# Patient Record
Sex: FEMALE | Race: WHITE | NOT HISPANIC OR LATINO
[De-identification: names, ages, dates, MRNs, and addresses within clinical notes are randomized per-mention and may not be internally consistent; named-entity substitution may affect disease eponyms.]

---

## 2018-08-07 PROBLEM — Z00.00 ENCOUNTER FOR PREVENTIVE HEALTH EXAMINATION: Status: ACTIVE | Noted: 2018-08-07

## 2018-08-13 ENCOUNTER — NON-APPOINTMENT (OUTPATIENT)
Age: 53
End: 2018-08-13

## 2018-08-13 ENCOUNTER — APPOINTMENT (OUTPATIENT)
Dept: CARDIOLOGY | Facility: CLINIC | Age: 53
End: 2018-08-13
Payer: COMMERCIAL

## 2018-08-13 VITALS
WEIGHT: 190 LBS | SYSTOLIC BLOOD PRESSURE: 100 MMHG | DIASTOLIC BLOOD PRESSURE: 70 MMHG | HEIGHT: 64 IN | BODY MASS INDEX: 32.44 KG/M2 | HEART RATE: 70 BPM

## 2018-08-13 DIAGNOSIS — Z82.0 FAMILY HISTORY OF EPILEPSY AND OTHER DISEASES OF THE NERVOUS SYSTEM: ICD-10-CM

## 2018-08-13 DIAGNOSIS — Z87.19 PERSONAL HISTORY OF OTHER DISEASES OF THE DIGESTIVE SYSTEM: ICD-10-CM

## 2018-08-13 DIAGNOSIS — Q21.1 ATRIAL SEPTAL DEFECT: ICD-10-CM

## 2018-08-13 DIAGNOSIS — Z83.3 FAMILY HISTORY OF DIABETES MELLITUS: ICD-10-CM

## 2018-08-13 PROCEDURE — 99244 OFF/OP CNSLTJ NEW/EST MOD 40: CPT

## 2018-08-13 PROCEDURE — 93000 ELECTROCARDIOGRAM COMPLETE: CPT

## 2018-08-13 RX ORDER — HYDROCHLOROTHIAZIDE 25 MG/1
25 TABLET ORAL DAILY
Qty: 90 | Refills: 1 | Status: ACTIVE | COMMUNITY

## 2018-08-27 ENCOUNTER — APPOINTMENT (OUTPATIENT)
Dept: CARDIOLOGY | Facility: CLINIC | Age: 53
End: 2018-08-27
Payer: COMMERCIAL

## 2018-08-27 PROBLEM — Q21.1 PFO (PATENT FORAMEN OVALE): Status: RESOLVED | Noted: 2018-08-13 | Resolved: 2018-08-27

## 2018-08-27 PROCEDURE — 93306 TTE W/DOPPLER COMPLETE: CPT

## 2018-08-29 ENCOUNTER — APPOINTMENT (OUTPATIENT)
Dept: CARDIOLOGY | Facility: CLINIC | Age: 53
End: 2018-08-29
Payer: COMMERCIAL

## 2018-08-29 PROCEDURE — 93351 STRESS TTE COMPLETE: CPT

## 2018-09-14 ENCOUNTER — APPOINTMENT (OUTPATIENT)
Dept: CARDIOLOGY | Facility: CLINIC | Age: 53
End: 2018-09-14
Payer: COMMERCIAL

## 2018-09-14 VITALS
WEIGHT: 190 LBS | DIASTOLIC BLOOD PRESSURE: 70 MMHG | BODY MASS INDEX: 32.44 KG/M2 | SYSTOLIC BLOOD PRESSURE: 110 MMHG | HEART RATE: 66 BPM | HEIGHT: 64 IN

## 2018-09-14 PROCEDURE — 99214 OFFICE O/P EST MOD 30 MIN: CPT

## 2019-10-08 ENCOUNTER — APPOINTMENT (OUTPATIENT)
Dept: CARDIOLOGY | Facility: CLINIC | Age: 54
End: 2019-10-08
Payer: COMMERCIAL

## 2019-10-08 ENCOUNTER — NON-APPOINTMENT (OUTPATIENT)
Age: 54
End: 2019-10-08

## 2019-10-08 VITALS
WEIGHT: 190 LBS | SYSTOLIC BLOOD PRESSURE: 118 MMHG | HEIGHT: 64 IN | BODY MASS INDEX: 32.44 KG/M2 | HEART RATE: 61 BPM | OXYGEN SATURATION: 98 % | DIASTOLIC BLOOD PRESSURE: 70 MMHG

## 2019-10-08 PROCEDURE — 93000 ELECTROCARDIOGRAM COMPLETE: CPT

## 2019-10-08 PROCEDURE — 99214 OFFICE O/P EST MOD 30 MIN: CPT

## 2019-10-08 RX ORDER — FEXOFENADINE HYDROCHLORIDE 180 MG/1
180 TABLET, FILM COATED ORAL
Refills: 0 | Status: DISCONTINUED | COMMUNITY
End: 2019-10-08

## 2019-10-08 NOTE — HISTORY OF PRESENT ILLNESS
[FreeTextEntry1] : Mrs. Beck is a 54-year-old female that came in today 10-8-19 for yearly follow up. \par \par She was last seen 9-14-18. \par \par As you know she has a history of abnormal ekg, hypertension, dyslipidemia, overweight, PFO, asthma and GERD. 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 She states that she is very active around the office but does not have an exercise regimen. \par She denies chest pain, sob, palpitations, dizziness, syncope, orthopnea and PND.\par \par Labs/tests\par \par labs 8-4-19 Na+ 141, K 4.4, bun/creat 21/0.85, AST 22, ALT 20, mag 2.0, , HDL 49, triglycerides 131, TSH 2.40, wbc 4.8, h/h 13.0/40.8, plat 204, abn u/a\par \par echo 8-27-18 ef 60% with normal ventricular function, mild MR, minimal AR, mild TR, mild AK, normal pulmonary pressures (23mmhg), atrial septal closure device seen and appears well seated with no residual interatrial flow by doppler\par \par S-echo 8-29-18 10:09 exercise reaching 85% MPR, peak bp 204/104, asx, normal EKG, grossly normal augmentation in levt ventricular function. PASP WNL\par \par labs 7-26-18 Na+ 140, K 4.6, bun/creat 21/0.76, AST 26, aLT 24, , HDL 54, triglycerides 116, TSH 2.60, wbc 5.1, h/h 12.8/38.9, plat 237

## 2019-10-08 NOTE — PHYSICAL EXAM
[Normal Appearance] : normal appearance [No Deformities] : no deformities [] : no respiratory distress [Respiration, Rhythm And Depth] : normal respiratory rhythm and effort [Exaggerated Use Of Accessory Muscles For Inspiration] : no accessory muscle use [Heart Rate And Rhythm] : heart rate and rhythm were normal [Bowel Sounds] : normal bowel sounds [Murmurs] : no murmurs present [Heart Sounds] : normal S1 and S2 [Abnormal Walk] : normal gait [Abdomen Soft] : soft [Affect] : the affect was normal [Skin Color & Pigmentation] : normal skin color and pigmentation [Mood] : the mood was normal [FreeTextEntry1] : no edema 2+ b/l distal pulses noted

## 2019-10-08 NOTE — DISCUSSION/SUMMARY
[FreeTextEntry1] : Plan 10-8-19\par \par -CAD risk factors of hypertension, dyslipidemia, overweight, age, abnormal EKG. Currently stable and asymptomatic. S-echo 8-29-18 as above without evidence of ischemia and normal pulmonary pressures. Echo as above 8-27-18 with preserved EF and normal ventricular function. The patient is aware of the diagnostic accuracy of testing. Therefore, she is aware if any changes in symptoms, she should be re-evaluated. She should continue with primary prevention, increase exercise. \par \par -h/o PFO closure [place] 12 years ago. Echo as above with stable closure. Surveillance monitoring (as long as stable symptoms per PD, Q 3-5years). Per Dr. Fisher, patient should be on life-long Asa 81mg daily (as long as there are no contraindications). Patient aware to make sure Asa is coated and to take with food. \par \par -lipids as above. On Statin. .\par \par -Hypertension. Controlled. Continue with lifestyle modification. \par \par -patient states she has followed with her PCP for recent labs as above, including abnormal u/a (per pt being rechecked). \par \par f/u yearly, sooner if changes in symptoms or status. \par \par Sincerely,\par \par Karen Riley PA-C\par patient seen and plan advised by supervising physician Dr. Viveros\par

## 2020-10-29 ENCOUNTER — NON-APPOINTMENT (OUTPATIENT)
Age: 55
End: 2020-10-29

## 2020-10-29 ENCOUNTER — APPOINTMENT (OUTPATIENT)
Dept: CARDIOLOGY | Facility: CLINIC | Age: 55
End: 2020-10-29
Payer: COMMERCIAL

## 2020-10-29 VITALS
SYSTOLIC BLOOD PRESSURE: 108 MMHG | OXYGEN SATURATION: 99 % | DIASTOLIC BLOOD PRESSURE: 80 MMHG | TEMPERATURE: 98 F | HEART RATE: 62 BPM | WEIGHT: 194 LBS | HEIGHT: 64 IN | BODY MASS INDEX: 33.12 KG/M2

## 2020-10-29 PROCEDURE — 93000 ELECTROCARDIOGRAM COMPLETE: CPT

## 2020-10-29 PROCEDURE — 99072 ADDL SUPL MATRL&STAF TM PHE: CPT

## 2020-10-29 PROCEDURE — 99214 OFFICE O/P EST MOD 30 MIN: CPT

## 2020-10-29 RX ORDER — ASPIRIN 81 MG
81 TABLET,CHEWABLE ORAL DAILY
Refills: 0 | Status: ACTIVE | COMMUNITY

## 2020-10-29 NOTE — DISCUSSION/SUMMARY
[FreeTextEntry1] : Stress echocardiogram August 2018 was negative for ischemia with good levels of exercise.  PASP was normal.\par \par history of asthma and history of PFO repair.\par \par Hypercholestremia: Continue statins. Dietary modification to reduce LDL was discussed. Have requested a copy of recent fasting lipid profile.\par \par Hypertension.  Controlled.  She already takes significant amount of water and potassium supplements. She does have occasional muscle cramps.\par \par Follow-up in 1 year.  \par \par Sections of this transcription were entered using Voice Recognition Software. There may be an error with phonetically similar sounding words. Please call me if there are any questions re the content.\par \par \par Sincerely,\par Dandy Fisher MD, FACC, TINO   \par \par

## 2020-10-29 NOTE — ASSESSMENT
[FreeTextEntry1] : EKG 8/3/18: Sinus rhythm. LVH. The inverted in inferior leads, V3 to V6. Previous EKG for comparison is not available.\par \par

## 2020-10-29 NOTE — PHYSICAL EXAM
[General Appearance - Well Developed] : well developed [Normal Appearance] : normal appearance [Well Groomed] : well groomed [General Appearance - Well Nourished] : well nourished [No Deformities] : no deformities [General Appearance - In No Acute Distress] : no acute distress [Normal Conjunctiva] : the conjunctiva exhibited no abnormalities [Eyelids - No Xanthelasma] : the eyelids demonstrated no xanthelasmas [Respiration, Rhythm And Depth] : normal respiratory rhythm and effort [Exaggerated Use Of Accessory Muscles For Inspiration] : no accessory muscle use [Auscultation Breath Sounds / Voice Sounds] : lungs were clear to auscultation bilaterally [Heart Rate And Rhythm] : heart rate and rhythm were normal [Heart Sounds] : normal S1 and S2 [Murmurs] : no murmurs present [Abdomen Soft] : soft [Abdomen Tenderness] : non-tender [FreeTextEntry1] : obese [Abnormal Walk] : normal gait [Gait - Sufficient For Exercise Testing] : the gait was sufficient for exercise testing [Nail Clubbing] : no clubbing of the fingernails [Cyanosis, Localized] : no localized cyanosis [Skin Color & Pigmentation] : normal skin color and pigmentation [Skin Turgor] : normal skin turgor [] : no rash [Oriented To Time, Place, And Person] : oriented to person, place, and time [Affect] : the affect was normal [Mood] : the mood was normal [No Anxiety] : not feeling anxious

## 2020-10-29 NOTE — HISTORY OF PRESENT ILLNESS
[FreeTextEntry1] : Jailyn is a 55-year-old female with history of hypertension, dyslipidemia, overweight, PFO, asthma and GERD. She was noted to have abnormal EKG. \par \par She does have mild shortness of breath on exertion.  It has been stable and not associated PND or orthopnea. \par \par In the past, EKG had new T-wave inversions in Dr. Davalos s office 2018  and she was referred for further cardiac evaluation.\par \par History of PFO closure 2016; currently on aspirin.\par \par Hypertension is well controlled.

## 2021-11-16 ENCOUNTER — APPOINTMENT (OUTPATIENT)
Dept: CARDIOLOGY | Facility: CLINIC | Age: 56
End: 2021-11-16
Payer: COMMERCIAL

## 2021-11-16 ENCOUNTER — NON-APPOINTMENT (OUTPATIENT)
Age: 56
End: 2021-11-16

## 2021-11-16 VITALS
TEMPERATURE: 97.9 F | HEART RATE: 68 BPM | SYSTOLIC BLOOD PRESSURE: 150 MMHG | DIASTOLIC BLOOD PRESSURE: 90 MMHG | BODY MASS INDEX: 33.29 KG/M2 | HEIGHT: 64 IN | WEIGHT: 195 LBS | OXYGEN SATURATION: 98 %

## 2021-11-16 DIAGNOSIS — J45.909 UNSPECIFIED ASTHMA, UNCOMPLICATED: ICD-10-CM

## 2021-11-16 PROCEDURE — 93000 ELECTROCARDIOGRAM COMPLETE: CPT

## 2021-11-16 PROCEDURE — 99214 OFFICE O/P EST MOD 30 MIN: CPT

## 2021-11-16 NOTE — HISTORY OF PRESENT ILLNESS
[FreeTextEntry1] : Jailyn is a 56-year-old female with history of hypertension, dyslipidemia, overweight, PFO, asthma and GERD. She was noted to have abnormal EKG. \par \par She has mild shortness of breath on exertion.  It has been stable and not associated PND or orthopnea. \par \par Previously, she had EKG with T-wave inversions which are benign after evaluation by echocardiogram and stress test\par \par History of PFO closure 2016; currently on aspirin.\par \par Hypertension is not well controlled.  Currently on hydrochlorothiazide and lisinopril. Due to allergies and sinus congestion, she had to take Sudafed and Claritin. She is aware that the Sudafed will raise her blood pressure. She is asymptomatic for hypertension\par \par Labs from September 2021 showed  and normal LFT and creatinine.  FBS 82.  A1c 5.6.

## 2021-11-16 NOTE — PHYSICAL EXAM
[General Appearance - Well Developed] : well developed [Normal Appearance] : normal appearance [Well Groomed] : well groomed [General Appearance - Well Nourished] : well nourished [No Deformities] : no deformities [General Appearance - In No Acute Distress] : no acute distress [Normal Conjunctiva] : the conjunctiva exhibited no abnormalities [Eyelids - No Xanthelasma] : the eyelids demonstrated no xanthelasmas [Respiration, Rhythm And Depth] : normal respiratory rhythm and effort [Exaggerated Use Of Accessory Muscles For Inspiration] : no accessory muscle use [Auscultation Breath Sounds / Voice Sounds] : lungs were clear to auscultation bilaterally [Heart Rate And Rhythm] : heart rate and rhythm were normal [Heart Sounds] : normal S1 and S2 [Murmurs] : no murmurs present [Abdomen Soft] : soft [Abdomen Tenderness] : non-tender [Abnormal Walk] : normal gait [Gait - Sufficient For Exercise Testing] : the gait was sufficient for exercise testing [Nail Clubbing] : no clubbing of the fingernails [Cyanosis, Localized] : no localized cyanosis [Skin Color & Pigmentation] : normal skin color and pigmentation [Skin Turgor] : normal skin turgor [] : no rash [Oriented To Time, Place, And Person] : oriented to person, place, and time [Affect] : the affect was normal [Mood] : the mood was normal [No Anxiety] : not feeling anxious [FreeTextEntry1] : obese

## 2021-11-16 NOTE — ASSESSMENT
[FreeTextEntry1] : Reviewed today:\par -EKG 8/3/18: Sinus rhythm. LVH. The inverted in inferior leads, V3 to V6. Previous EKG for comparison is not available.\par -Labs September 2021: \par \par

## 2021-11-16 NOTE — DISCUSSION/SUMMARY
[FreeTextEntry1] : Stress echocardiogram August 2018 was negative for ischemia with good levels of exercise.  PASP was normal.\par \par history of asthma and history of PFO repair.\par \par Hypercholestremia: Continue statins. Dietary modification and exercise program to reduce LDL was discussed. In September 2021, LDL was 102. Target LDL less than 85. Repeat lipid profile with primary care is scheduled for December.\par \par Hypertension.  uncontrolled. The patient feels that this is secondary to her taking Sudafed. Otherwise her blood pressures are well controlled. I have asked her to take an extra half a tablet of lisinopril when she takes a Sudafed. Follow-up blood pressure with primary care office and she will call us with the readings\par \par EKG in the office today has nonspecific T waves in lead V3 and F. This is not significantly different from previous EKG. \par \par Follow-up in 2-3 m\par \par Sections of this transcription were entered using Voice Recognition Software. There may be an error with phonetically similar sounding words. Please call me if there are any questions re the content.\par \par \par Sincerely,\par Dandy Fisher MD, FACC, TINO   \par \par

## 2022-02-08 ENCOUNTER — APPOINTMENT (OUTPATIENT)
Dept: CARDIOLOGY | Facility: CLINIC | Age: 57
End: 2022-02-08
Payer: COMMERCIAL

## 2022-02-08 VITALS
OXYGEN SATURATION: 100 % | SYSTOLIC BLOOD PRESSURE: 122 MMHG | BODY MASS INDEX: 33.97 KG/M2 | TEMPERATURE: 96.9 F | WEIGHT: 199 LBS | HEART RATE: 66 BPM | DIASTOLIC BLOOD PRESSURE: 72 MMHG | HEIGHT: 64 IN

## 2022-02-08 DIAGNOSIS — K21.9 GASTRO-ESOPHAGEAL REFLUX DISEASE W/OUT ESOPHAGITIS: ICD-10-CM

## 2022-02-08 PROCEDURE — 99214 OFFICE O/P EST MOD 30 MIN: CPT

## 2022-02-08 NOTE — ASSESSMENT
[FreeTextEntry1] : Reviewed today:\par -EKG 8/3/18: Sinus rhythm. LVH. The inverted in inferior leads, V3 to V6. Previous EKG for comparison is not available.\par -Labs September 2021: \par -Labs December 2021: , .\par \par

## 2022-02-08 NOTE — HISTORY OF PRESENT ILLNESS
[FreeTextEntry1] : Jailyn is a 56-year-old female with history of hypertension, dyslipidemia, overweight, PFO, asthma and GERD. She was noted to have abnormal EKG. \par \par She has mild shortness of breath on exertion.  It has been stable and not associated PND or orthopnea. \par \par Previously, she had EKG with T-wave inversions which are benign after evaluation by echocardiogram and stress test\par \par History of PFO closure 2016; currently on aspirin.  History of TIA or CVA or syncope.\par \par Hypertension is  well controlled.  Currently on hydrochlorothiazide and lisinopril. \par \par Labs from September 2021 showed  and normal LFT and creatinine.  FBS 82.  A1c 5.6.  LDL was 116 in December 2021.  .  Normal LFT.  Normal TSH.  A1c 5.4.

## 2022-02-08 NOTE — DISCUSSION/SUMMARY
[FreeTextEntry1] : Stress echocardiogram August 2018 was negative for ischemia with good levels of exercise.  PASP was normal.\par \par history of asthma and history of PFO repair.\par \par Hypercholestremia: Continue statins. Dietary modification and exercise program to reduce LDL was discussed. In September 2021, LDL was 102. Target LDL less than 85.  December, the LDL increased.  The patient admits to dietary indiscretion during the holiday season.  Follow-up fasting lipids in 2 to 3 months.  Lab slip was given to her.\par \par Hypertension. controlled.  Continue current medications\par \par EKG in the office during previous OV has nonspecific T waves in lead V3 and F. This is not significantly different from previous EKG. \par \par ETT and echo should be performed for evaluation of dyspnea on exertion as well as exercise ability and abnormal EKG as well as blood pressure response to exercise. \par \par \par OV after above tests\par \par Sections of this transcription were entered using Voice Recognition Software. There may be an error with phonetically similar sounding words. Please call me if there are any questions re the content.\par \par \par Sincerely,\par Dandy Fisher MD, FACC, TINO   \par \par

## 2022-05-02 ENCOUNTER — APPOINTMENT (OUTPATIENT)
Dept: CARDIOLOGY | Facility: CLINIC | Age: 57
End: 2022-05-02
Payer: COMMERCIAL

## 2022-05-02 PROCEDURE — 93015 CV STRESS TEST SUPVJ I&R: CPT

## 2022-05-02 PROCEDURE — 93306 TTE W/DOPPLER COMPLETE: CPT

## 2022-05-23 ENCOUNTER — APPOINTMENT (OUTPATIENT)
Dept: CARDIOLOGY | Facility: CLINIC | Age: 57
End: 2022-05-23
Payer: COMMERCIAL

## 2022-05-23 PROCEDURE — A9502: CPT

## 2022-05-23 PROCEDURE — 93015 CV STRESS TEST SUPVJ I&R: CPT

## 2022-05-23 PROCEDURE — 78452 HT MUSCLE IMAGE SPECT MULT: CPT

## 2022-05-27 ENCOUNTER — APPOINTMENT (OUTPATIENT)
Dept: CARDIOLOGY | Facility: CLINIC | Age: 57
End: 2022-05-27
Payer: COMMERCIAL

## 2022-05-27 VITALS
HEIGHT: 64 IN | TEMPERATURE: 97.3 F | WEIGHT: 198 LBS | HEART RATE: 64 BPM | DIASTOLIC BLOOD PRESSURE: 70 MMHG | OXYGEN SATURATION: 100 % | SYSTOLIC BLOOD PRESSURE: 100 MMHG | BODY MASS INDEX: 33.8 KG/M2

## 2022-05-27 PROCEDURE — 99214 OFFICE O/P EST MOD 30 MIN: CPT

## 2022-05-27 NOTE — ASSESSMENT
[FreeTextEntry1] : Reviewed today 5/27/2022:\par -Labs: 4/2022 Total chol 181  HDL 46 Creat 0.82 K+ 4.2 Ast 18 Alt 18 \par -Nuclear Stress test: Exercise 6min 7 METS REAGAN that resolved with rest. EKGs non-diagnostic due to baseline abnormality. Nuclear Findings: Small mild defect in anteroseptal wall that is fixed- sugg of scar. Normal wall motion. Accuracy limited by breast attenuation.\par -ETT: 5/2/2022 Suggestive is ischemia by EKG due to depressions inferolaterally in recovery\par -ECHO 5/2/2022 EF 60% Mild MR, Normal LV systolic function and no seg. wall motion abnormalities. Ectopy noted Mild ID. Normal PASP. \par \par Previous testing:\par -EKG 8/3/18: Sinus rhythm. LVH. The inverted in inferior leads, V3 to V6. Previous EKG for comparison is not available.\par -Stress echocardiogram August 2018 was negative for ischemia with good levels of exercise.  PASP was normal.\par -Labs from September 2021 showed  and normal LFT and creatinine.  FBS 82.  A1c 5.6.  LDL was 116 in December 2021.  .  Normal LFT.  Normal TSH.  A1c 5.4.\par \par \par

## 2022-05-27 NOTE — PHYSICAL EXAM
[General Appearance - Well Developed] : well developed [Normal Appearance] : normal appearance [Well Groomed] : well groomed [General Appearance - Well Nourished] : well nourished [No Deformities] : no deformities [General Appearance - In No Acute Distress] : no acute distress [Normal Conjunctiva] : the conjunctiva exhibited no abnormalities [Eyelids - No Xanthelasma] : the eyelids demonstrated no xanthelasmas [Respiration, Rhythm And Depth] : normal respiratory rhythm and effort [Exaggerated Use Of Accessory Muscles For Inspiration] : no accessory muscle use [Auscultation Breath Sounds / Voice Sounds] : lungs were clear to auscultation bilaterally [Heart Rate And Rhythm] : heart rate and rhythm were normal [Heart Sounds] : normal S1 and S2 [Murmurs] : no murmurs present [Abdomen Soft] : soft [Abdomen Tenderness] : non-tender [Abnormal Walk] : normal gait [Gait - Sufficient For Exercise Testing] : the gait was sufficient for exercise testing [Nail Clubbing] : no clubbing of the fingernails [Cyanosis, Localized] : no localized cyanosis [Skin Color & Pigmentation] : normal skin color and pigmentation [Skin Turgor] : normal skin turgor [] : no rash [Oriented To Time, Place, And Person] : oriented to person, place, and time [Affect] : the affect was normal [Mood] : the mood was normal [No Anxiety] : not feeling anxious [FreeTextEntry1] : obese [Normal] : clear lung fields, good air entry, no respiratory distress [Normal Gait] : normal gait [No Edema] : no edema [No Rash] : no rash [Moves all extremities] : moves all extremities [Alert and Oriented] : alert and oriented

## 2022-05-27 NOTE — DISCUSSION/SUMMARY
[FreeTextEntry1] : JUANCARLOS PELAYO is a 57 year old F who presents today with the following active issues: \par \par Reviewed with patient most recent labs and testing. All questions answered. \par \par History of asthma and history of PFO repair.\par \par Hypercholestremia: Dietary modification and exercise program to reduce LDL was discussed. In September 2021, LDL was 102. Most recent . Target LDL less than 85.  \par - Due to muscle cramping she is to HOLD her Lipitor fo 7 days. We will call her to see if her symptoms resolve. If so we will trial her on Pravastatin to see if this is more tolerable. If change in meds she will need to repeat laba\par \par Hypertension. controlled.  Continue current medications.\par She denies any symptoms of hypotension\par Non-pharmacological ways of reducing BP have been discussed.\par Dietary and lifestyle changes to reduce weight including exercise program, reduction of total calories and reduction of carbs was discussed. \par \par Routine 6 month follow up or sooner if needed \par Sincerely,\par \par Barbi Cornejo ANP-C\par Patients history, testing, and plan reviewed with supervising MD: Dr. Mika Minor \par \par \par \par \par

## 2022-05-27 NOTE — HISTORY OF PRESENT ILLNESS
[FreeTextEntry1] : Jailyn is a 57-year-old female with history of hypertension, dyslipidemia, overweight, PFO, asthma and GERD. \par \par Last visit was 2/8/2022. Today she is here to review her recent testing. In the interim there have not been any hospitalizations or procedures. She denies chest pain, pressure, palpitations, unusual shortness of breath, REAGAN, orthopnea, LE edema, lightheadedness, dizziness, near syncope or syncope. She reports cramping and muscle aching in her legs. She was instructed by her PCP to take Magnesium. She has not noticed a sig difference. \par \par Previously, she had EKG with T-wave inversions which are benign after evaluation by echocardiogram and stress test\par \par History of PFO closure 2016; currently on aspirin.  History of TIA or CVA or syncope.\par \par Hypertension is  well controlled.  Currently on hydrochlorothiazide and lisinopril. \par \par \par

## 2022-06-06 RX ORDER — ATORVASTATIN CALCIUM 20 MG/1
20 TABLET, FILM COATED ORAL
Refills: 0 | Status: DISCONTINUED | COMMUNITY
End: 2022-06-06

## 2022-09-06 ENCOUNTER — OFFICE (OUTPATIENT)
Dept: URBAN - METROPOLITAN AREA CLINIC 105 | Facility: CLINIC | Age: 57
Setting detail: OPHTHALMOLOGY
End: 2022-09-06
Payer: COMMERCIAL

## 2022-09-06 DIAGNOSIS — H25.012: ICD-10-CM

## 2022-09-06 PROCEDURE — 92004 COMPRE OPH EXAM NEW PT 1/>: CPT | Performed by: OPHTHALMOLOGY

## 2022-09-06 ASSESSMENT — VISUAL ACUITY
OS_BCVA: 20/40+1
OD_BCVA: 20/30-1

## 2022-09-06 ASSESSMENT — TONOMETRY
OS_IOP_MMHG: 18
OD_IOP_MMHG: 18

## 2022-09-06 ASSESSMENT — REFRACTION_AUTOREFRACTION
OD_SPHERE: -7.25
OS_CYLINDER: -0.75
OD_AXIS: 070
OS_SPHERE: -9.25
OD_CYLINDER: -2.50
OS_AXIS: 116

## 2022-09-06 ASSESSMENT — KERATOMETRY
OS_K1POWER_DIOPTERS: 45.75
OS_K2POWER_DIOPTERS: 46.25
OD_K1POWER_DIOPTERS: 45.25
OD_AXISANGLE_DEGREES: 153
OD_K2POWER_DIOPTERS: 46.25
OS_AXISANGLE_DEGREES: 033

## 2022-09-06 ASSESSMENT — REFRACTION_CURRENTRX
OS_CYLINDER: -1.75
OD_AXIS: 062
OS_OVR_VA: 20/
OS_VPRISM_DIRECTION: PROGS
OD_SPHERE: -8.75
OS_AXIS: 107
OD_ADD: +2.00
OD_CYLINDER: -1.75
OS_ADD: +2.00
OD_VPRISM_DIRECTION: PROGS
OD_OVR_VA: 20/
OS_SPHERE: -8.75

## 2022-09-06 ASSESSMENT — AXIALLENGTH_DERIVED
OD_AL: 26.2486
OD_AL: 26.3697
OS_AL: 26.8098

## 2022-09-06 ASSESSMENT — REFRACTION_MANIFEST
OD_SPHERE: -7.00
OD_CYLINDER: -2.50
OD_AXIS: 070
OS_SPHERE: -9.00
OD_VA1: 20/20-2
OS_VA1: 20/40-

## 2022-09-06 ASSESSMENT — SPHEQUIV_DERIVED
OD_SPHEQUIV: -8.25
OD_SPHEQUIV: -8.5
OS_SPHEQUIV: -9.625

## 2022-09-06 ASSESSMENT — CONFRONTATIONAL VISUAL FIELD TEST (CVF)
OS_FINDINGS: FULL
OD_FINDINGS: FULL

## 2022-09-15 ENCOUNTER — OFFICE (OUTPATIENT)
Dept: URBAN - METROPOLITAN AREA CLINIC 105 | Facility: CLINIC | Age: 57
Setting detail: OPHTHALMOLOGY
End: 2022-09-15
Payer: COMMERCIAL

## 2022-09-15 DIAGNOSIS — H25.12: ICD-10-CM

## 2022-09-15 DIAGNOSIS — H25.13: ICD-10-CM

## 2022-09-15 PROCEDURE — 99213 OFFICE O/P EST LOW 20 MIN: CPT | Performed by: OPHTHALMOLOGY

## 2022-09-15 PROCEDURE — 92136 OPHTHALMIC BIOMETRY: CPT | Performed by: OPHTHALMOLOGY

## 2022-09-15 ASSESSMENT — REFRACTION_AUTOREFRACTION
OD_CYLINDER: -2.25
OS_CYLINDER: -1.25
OD_SPHERE: -7.50
OD_AXIS: 070
OS_SPHERE: -9.00
OS_AXIS: 114

## 2022-09-15 ASSESSMENT — REFRACTION_CURRENTRX
OS_OVR_VA: 20/
OS_VPRISM_DIRECTION: PROGS
OS_AXIS: 107
OS_ADD: +2.00
OD_AXIS: 062
OD_CYLINDER: -1.75
OD_OVR_VA: 20/
OD_SPHERE: -8.75
OD_ADD: +2.00
OS_SPHERE: -8.75
OD_VPRISM_DIRECTION: PROGS
OS_CYLINDER: -1.75

## 2022-09-15 ASSESSMENT — REFRACTION_MANIFEST
OS_SPHERE: -9.00
OD_CYLINDER: -2.50
OD_AXIS: 070
OS_VA1: 20/40-
OD_SPHERE: -7.00
OD_VA1: 20/30-2

## 2022-09-15 ASSESSMENT — AXIALLENGTH_DERIVED
OD_AL: 26.2486
OD_AL: 26.4307
OS_AL: 26.8098

## 2022-09-15 ASSESSMENT — VISUAL ACUITY
OS_BCVA: 20/30-1
OD_BCVA: 20/40

## 2022-09-15 ASSESSMENT — KERATOMETRY
OD_K2POWER_DIOPTERS: 46.25
OD_AXISANGLE_DEGREES: 153
OD_K1POWER_DIOPTERS: 45.25
OS_AXISANGLE_DEGREES: 045
OS_K1POWER_DIOPTERS: 45.75
OS_K2POWER_DIOPTERS: 46.25

## 2022-09-15 ASSESSMENT — TONOMETRY
OS_IOP_MMHG: 16
OD_IOP_MMHG: 16

## 2022-09-15 ASSESSMENT — SPHEQUIV_DERIVED
OD_SPHEQUIV: -8.625
OD_SPHEQUIV: -8.25
OS_SPHEQUIV: -9.625

## 2022-09-15 ASSESSMENT — CONFRONTATIONAL VISUAL FIELD TEST (CVF)
OS_FINDINGS: FULL
OD_FINDINGS: FULL

## 2022-11-07 ENCOUNTER — AMBULATORY SURGERY CENTER (OUTPATIENT)
Dept: URBAN - METROPOLITAN AREA SURGERY 4 | Facility: SURGERY | Age: 57
Setting detail: OPHTHALMOLOGY
End: 2022-11-07
Payer: COMMERCIAL

## 2022-11-07 ENCOUNTER — RX ONLY (RX ONLY)
Age: 57
End: 2022-11-07

## 2022-11-07 DIAGNOSIS — H52.212: ICD-10-CM

## 2022-11-07 DIAGNOSIS — H25.12: ICD-10-CM

## 2022-11-07 PROBLEM — H25.012 CORTICAL CATARACT; LEFT EYE: Status: RESOLVED | Noted: 2022-09-06 | Resolved: 2022-11-07

## 2022-11-07 PROCEDURE — FEMTO CATARACT LASER: Performed by: OPHTHALMOLOGY

## 2022-11-07 PROCEDURE — 66984 XCAPSL CTRC RMVL W/O ECP: CPT | Performed by: OPHTHALMOLOGY

## 2022-11-08 ENCOUNTER — OFFICE (OUTPATIENT)
Dept: URBAN - METROPOLITAN AREA CLINIC 105 | Facility: CLINIC | Age: 57
Setting detail: OPHTHALMOLOGY
End: 2022-11-08
Payer: COMMERCIAL

## 2022-11-08 DIAGNOSIS — H25.11: ICD-10-CM

## 2022-11-08 PROCEDURE — 92136 OPHTHALMIC BIOMETRY: CPT | Performed by: OPHTHALMOLOGY

## 2022-11-08 ASSESSMENT — REFRACTION_CURRENTRX
OS_VPRISM_DIRECTION: PROGS
OS_ADD: +2.00
OS_CYLINDER: -1.75
OD_VPRISM_DIRECTION: PROGS
OD_AXIS: 062
OD_OVR_VA: 20/
OD_CYLINDER: -1.75
OS_SPHERE: -8.75
OS_AXIS: 107
OS_OVR_VA: 20/
OD_ADD: +2.00
OD_SPHERE: -8.75

## 2022-11-08 ASSESSMENT — REFRACTION_AUTOREFRACTION
OD_AXIS: 067
OD_CYLINDER: -2.25
OS_CYLINDER: -1.00
OS_SPHERE: -0.50
OS_AXIS: 050
OD_SPHERE: -7.25

## 2022-11-08 ASSESSMENT — KERATOMETRY
OD_K1POWER_DIOPTERS: 44.75
OS_K1POWER_DIOPTERS: 44.75
OD_AXISANGLE_DEGREES: 156
OS_K2POWER_DIOPTERS: 45.25
OD_K2POWER_DIOPTERS: 46.25
OS_AXISANGLE_DEGREES: 156

## 2022-11-08 ASSESSMENT — AXIALLENGTH_DERIVED
OS_AL: 23.4316
OS_AL: 23.4316
OD_AL: 26.4237
OD_AL: 26.3628

## 2022-11-08 ASSESSMENT — CONFRONTATIONAL VISUAL FIELD TEST (CVF)
OD_FINDINGS: FULL
OS_FINDINGS: FULL

## 2022-11-08 ASSESSMENT — VISUAL ACUITY
OS_BCVA: 20/400
OD_BCVA: 20/50+2

## 2022-11-08 ASSESSMENT — REFRACTION_MANIFEST
OD_CYLINDER: -2.50
OS_CYLINDER: -1.00
OS_VA1: 20/25
OD_SPHERE: -7.00
OS_AXIS: 050
OS_SPHERE: -0.50
OD_AXIS: 070
OD_VA1: 20/30-2

## 2022-11-08 ASSESSMENT — SPHEQUIV_DERIVED
OD_SPHEQUIV: -8.375
OS_SPHEQUIV: -1
OS_SPHEQUIV: -1
OD_SPHEQUIV: -8.25

## 2022-11-08 ASSESSMENT — TONOMETRY: OS_IOP_MMHG: 15

## 2022-12-05 ENCOUNTER — AMBULATORY SURGERY CENTER (OUTPATIENT)
Dept: URBAN - METROPOLITAN AREA SURGERY 4 | Facility: SURGERY | Age: 57
Setting detail: OPHTHALMOLOGY
End: 2022-12-05
Payer: COMMERCIAL

## 2022-12-05 DIAGNOSIS — H52.211: ICD-10-CM

## 2022-12-05 DIAGNOSIS — H25.11: ICD-10-CM

## 2022-12-05 PROBLEM — H52.7 REFRACTIVE ERROR: Status: ACTIVE | Noted: 2022-09-06

## 2022-12-05 PROBLEM — H52.31 ANISOMETROPIA: Status: RESOLVED | Noted: 2022-11-08 | Resolved: 2022-12-05

## 2022-12-05 PROBLEM — H52.13 MYOPIA; BOTH EYES: Status: ACTIVE | Noted: 2022-09-06

## 2022-12-05 PROBLEM — Z96.1 PSEUDOPHAKIA: Status: ACTIVE | Noted: 2022-11-08

## 2022-12-05 PROBLEM — H52.223 ASTIGMATISM, REGULAR; BOTH EYES: Status: ACTIVE | Noted: 2022-09-06

## 2022-12-05 PROCEDURE — FEMTO CATARACT LASER: Performed by: OPHTHALMOLOGY

## 2022-12-05 PROCEDURE — 66984 XCAPSL CTRC RMVL W/O ECP: CPT | Performed by: OPHTHALMOLOGY

## 2022-12-06 ENCOUNTER — OFFICE (OUTPATIENT)
Dept: URBAN - METROPOLITAN AREA CLINIC 105 | Facility: CLINIC | Age: 57
Setting detail: OPHTHALMOLOGY
End: 2022-12-06
Payer: COMMERCIAL

## 2022-12-06 DIAGNOSIS — H26.492: ICD-10-CM

## 2022-12-06 DIAGNOSIS — Z96.1: ICD-10-CM

## 2022-12-06 DIAGNOSIS — H52.13: ICD-10-CM

## 2022-12-06 PROCEDURE — 99024 POSTOP FOLLOW-UP VISIT: CPT | Performed by: OPHTHALMOLOGY

## 2022-12-06 ASSESSMENT — KERATOMETRY
OS_AXISANGLE_DEGREES: 172
OS_K1POWER_DIOPTERS: 45.25
OD_AXISANGLE_DEGREES: 170
OD_K1POWER_DIOPTERS: 44.50
OD_K2POWER_DIOPTERS: 45.50
OS_K2POWER_DIOPTERS: 45.75

## 2022-12-06 ASSESSMENT — SPHEQUIV_DERIVED
OD_SPHEQUIV: 0
OD_SPHEQUIV: -8.25
OS_SPHEQUIV: -1
OS_SPHEQUIV: -1

## 2022-12-06 ASSESSMENT — REFRACTION_MANIFEST
OS_CYLINDER: -1.00
OD_CYLINDER: -2.50
OD_VA1: 20/30-2
OS_SPHERE: -0.50
OS_AXIS: 050
OD_AXIS: 070
OS_VA1: 20/25
OD_SPHERE: -7.00

## 2022-12-06 ASSESSMENT — REFRACTION_CURRENTRX
OS_ADD: +2.00
OD_SPHERE: -8.75
OD_AXIS: 062
OD_CYLINDER: -1.75
OS_AXIS: 107
OD_ADD: +2.00
OS_CYLINDER: -1.75
OS_SPHERE: -8.75
OD_VPRISM_DIRECTION: PROGS
OD_OVR_VA: 20/
OS_OVR_VA: 20/
OS_VPRISM_DIRECTION: PROGS

## 2022-12-06 ASSESSMENT — REFRACTION_AUTOREFRACTION
OS_CYLINDER: -1.00
OS_SPHERE: -0.50
OD_SPHERE: +0.75
OD_CYLINDER: -1.50
OS_AXIS: 078
OD_AXIS: 078

## 2022-12-06 ASSESSMENT — CONFRONTATIONAL VISUAL FIELD TEST (CVF)
OD_FINDINGS: FULL
OS_FINDINGS: FULL

## 2022-12-06 ASSESSMENT — AXIALLENGTH_DERIVED
OD_AL: 26.5941
OS_AL: 23.2519
OD_AL: 23.0535
OS_AL: 23.2519

## 2022-12-06 ASSESSMENT — VISUAL ACUITY
OD_BCVA: 20/30-1
OS_BCVA: 20/30+2

## 2022-12-06 ASSESSMENT — TONOMETRY: OD_IOP_MMHG: 20

## 2023-01-03 ENCOUNTER — OFFICE (OUTPATIENT)
Dept: URBAN - METROPOLITAN AREA CLINIC 105 | Facility: CLINIC | Age: 58
Setting detail: OPHTHALMOLOGY
End: 2023-01-03
Payer: COMMERCIAL

## 2023-01-03 DIAGNOSIS — H26.492: ICD-10-CM

## 2023-01-03 DIAGNOSIS — Z96.1: ICD-10-CM

## 2023-01-03 PROCEDURE — 99024 POSTOP FOLLOW-UP VISIT: CPT | Performed by: OPTOMETRIST

## 2023-01-03 ASSESSMENT — SPHEQUIV_DERIVED
OD_SPHEQUIV: -0.375
OD_SPHEQUIV: -0.375
OS_SPHEQUIV: -1.375
OS_SPHEQUIV: -1.375

## 2023-01-03 ASSESSMENT — REFRACTION_CURRENTRX
OS_AXIS: 107
OD_VPRISM_DIRECTION: PROGS
OS_SPHERE: -8.75
OD_ADD: +2.00
OS_VPRISM_DIRECTION: PROGS
OD_AXIS: 062
OS_OVR_VA: 20/
OS_ADD: +2.00
OD_CYLINDER: -1.75
OS_CYLINDER: -1.75
OD_OVR_VA: 20/
OD_SPHERE: -8.75

## 2023-01-03 ASSESSMENT — KERATOMETRY
OD_K2POWER_DIOPTERS: 46.00
OD_K1POWER_DIOPTERS: 44.00
OD_AXISANGLE_DEGREES: 162
OS_AXISANGLE_DEGREES: 178
OS_K1POWER_DIOPTERS: 46.00
OS_K2POWER_DIOPTERS: 46.25

## 2023-01-03 ASSESSMENT — REFRACTION_AUTOREFRACTION
OS_AXIS: 084
OS_SPHERE: -1.00
OS_CYLINDER: -0.75
OD_CYLINDER: -1.25
OD_SPHERE: +0.25
OD_AXIS: 075

## 2023-01-03 ASSESSMENT — AXIALLENGTH_DERIVED
OD_AL: 23.1939
OS_AL: 23.1711
OD_AL: 23.1939
OS_AL: 23.1711

## 2023-01-03 ASSESSMENT — REFRACTION_MANIFEST
OD_ADD: +2.00
OS_ADD: +2.00
OD_VA1: 20/20
OD_SPHERE: +0.25
OS_CYLINDER: -0.75
OS_VA1: 20/20
OD_CYLINDER: -1.25
OS_AXIS: 085
OD_AXIS: 075
OS_SPHERE: -1.00

## 2023-01-03 ASSESSMENT — VISUAL ACUITY
OD_BCVA: 20/60-2
OS_BCVA: 20/25+1

## 2023-01-03 ASSESSMENT — CONFRONTATIONAL VISUAL FIELD TEST (CVF)
OD_FINDINGS: FULL
OS_FINDINGS: FULL

## 2023-01-03 ASSESSMENT — CORNEAL EDEMA CLINICAL DESCRIPTION
OD_CORNEALEDEMA: ABSENT
OS_CORNEALEDEMA: ABSENT

## 2023-01-03 ASSESSMENT — TONOMETRY
OD_IOP_MMHG: 17
OS_IOP_MMHG: 18

## 2023-01-18 ENCOUNTER — OFFICE (OUTPATIENT)
Dept: URBAN - METROPOLITAN AREA CLINIC 105 | Facility: CLINIC | Age: 58
Setting detail: OPHTHALMOLOGY
End: 2023-01-18
Payer: COMMERCIAL

## 2023-01-18 DIAGNOSIS — H43.392: ICD-10-CM

## 2023-01-18 PROCEDURE — 92250 FUNDUS PHOTOGRAPHY W/I&R: CPT | Performed by: OPHTHALMOLOGY

## 2023-01-18 PROCEDURE — 92012 INTRM OPH EXAM EST PATIENT: CPT | Performed by: OPHTHALMOLOGY

## 2023-01-18 ASSESSMENT — REFRACTION_CURRENTRX
OD_OVR_VA: 20/
OS_SPHERE: -8.75
OD_SPHERE: -8.75
OD_AXIS: 062
OS_VPRISM_DIRECTION: PROGS
OS_AXIS: 107
OS_ADD: +2.00
OD_ADD: +2.00
OD_VPRISM_DIRECTION: PROGS
OS_CYLINDER: -1.75
OS_OVR_VA: 20/
OD_CYLINDER: -1.75

## 2023-01-18 ASSESSMENT — SPHEQUIV_DERIVED
OS_SPHEQUIV: -1.375
OD_SPHEQUIV: -0.375
OS_SPHEQUIV: -1.375
OD_SPHEQUIV: -0.375

## 2023-01-18 ASSESSMENT — CONFRONTATIONAL VISUAL FIELD TEST (CVF)
OS_FINDINGS: FULL
OD_FINDINGS: FULL

## 2023-01-18 ASSESSMENT — CORNEAL EDEMA CLINICAL DESCRIPTION
OS_CORNEALEDEMA: ABSENT
OD_CORNEALEDEMA: ABSENT

## 2023-01-18 ASSESSMENT — KERATOMETRY
OS_K1POWER_DIOPTERS: 46.00
OS_AXISANGLE_DEGREES: 178
OD_K1POWER_DIOPTERS: 44.00
OS_K2POWER_DIOPTERS: 46.25
OD_AXISANGLE_DEGREES: 162
OD_K2POWER_DIOPTERS: 46.00

## 2023-01-18 ASSESSMENT — REFRACTION_MANIFEST
OD_SPHERE: +0.25
OD_CYLINDER: -1.25
OS_AXIS: 085
OD_AXIS: 075
OS_VA1: 20/20
OS_SPHERE: -1.00
OS_CYLINDER: -0.75
OD_VA1: 20/20
OD_ADD: +2.00
OS_ADD: +2.00

## 2023-01-18 ASSESSMENT — AXIALLENGTH_DERIVED
OD_AL: 23.1939
OD_AL: 23.1939
OS_AL: 23.1711
OS_AL: 23.1711

## 2023-01-18 ASSESSMENT — REFRACTION_AUTOREFRACTION
OD_SPHERE: +0.25
OS_SPHERE: -1.00
OD_CYLINDER: -1.25
OS_AXIS: 084
OD_AXIS: 075
OS_CYLINDER: -0.75

## 2023-01-18 ASSESSMENT — VISUAL ACUITY
OS_BCVA: 20/40
OD_BCVA: 20/60-2

## 2023-01-19 ENCOUNTER — APPOINTMENT (OUTPATIENT)
Dept: NEUROSURGERY | Facility: CLINIC | Age: 58
End: 2023-01-19
Payer: COMMERCIAL

## 2023-01-19 VITALS
HEART RATE: 69 BPM | WEIGHT: 198 LBS | BODY MASS INDEX: 33.8 KG/M2 | DIASTOLIC BLOOD PRESSURE: 87 MMHG | TEMPERATURE: 98 F | HEIGHT: 64 IN | SYSTOLIC BLOOD PRESSURE: 157 MMHG

## 2023-01-19 DIAGNOSIS — M79.605 LOW BACK PAIN, UNSPECIFIED: ICD-10-CM

## 2023-01-19 DIAGNOSIS — R20.2 ANESTHESIA OF SKIN: ICD-10-CM

## 2023-01-19 DIAGNOSIS — R20.0 ANESTHESIA OF SKIN: ICD-10-CM

## 2023-01-19 DIAGNOSIS — M51.36 OTHER INTERVERTEBRAL DISC DEGENERATION, LUMBAR REGION: ICD-10-CM

## 2023-01-19 DIAGNOSIS — M54.50 LOW BACK PAIN, UNSPECIFIED: ICD-10-CM

## 2023-01-19 PROCEDURE — 99203 OFFICE O/P NEW LOW 30 MIN: CPT

## 2023-01-19 NOTE — ASSESSMENT
[FreeTextEntry1] : Discussed the history, physical examination findings, and recent imaging with the patient with all questions answered.  After review of the imaging and clinical exam discussed that further evaluation is warranted with an MRI of the lumbar spine.  The patient has noted weakness in the L4 nerve distribution with degenerative disc disease noted at this region.  Continue the steroid taper to completion along with rest and modified activity.  The patient will follow up after the imaging studies are complete to discuss further treatment recommendations

## 2023-01-19 NOTE — DATA REVIEWED
[de-identified] : Were reviewed of the lumbar spine -1/19/2023: No acute fracture/dislocation; lumbar spondylosis; degenerative disc disease most pronounced at L4-5

## 2023-01-19 NOTE — PHYSICAL EXAM
[General Appearance - Alert] : alert [General Appearance - In No Acute Distress] : in no acute distress [General Appearance - Well Nourished] : well nourished [General Appearance - Well Developed] : well developed [General Appearance - Well-Appearing] : healthy appearing [] : normal voice and communication [Oriented To Time, Place, And Person] : oriented to person, place, and time [Impaired Insight] : insight and judgment were intact [Affect] : the affect was normal [Mood] : the mood was normal [Memory Recent] : recent memory was not impaired [Memory Remote] : remote memory was not impaired [Person] : oriented to person [Place] : oriented to place [Time] : oriented to time [Motor Tone] : muscle tone was normal in all four extremities [Involuntary Movements] : no involuntary movements were seen [No Muscle Atrophy] : normal bulk in all four extremities [5] : L2 Iliopsoas 5/5 [4] : L4/5 ankle dorsiflexors 4/5 [FreeTextEntry6] : Weakness with subjective numbness and tingling of the left lower extremity [FreeTextEntry8] : Patient ambulates unassisted with a limping gait

## 2023-01-19 NOTE — HISTORY OF PRESENT ILLNESS
[< 3 months] : less than 3 months [FreeTextEntry1] : Back pain/left lower extremity weakness. [de-identified] : 57-year-old female presents to the neurosurgery office for an initial office visit and accompanied by her .  She is here for evaluation of back pain and left lower extremity radicular symptoms.  The patient states that her symptoms have been present for about a week with no specific mechanism of injury or recent trauma.  The patient symptoms were so severe that it warranted a trip to the emergency department at [facility].  She complained of decreased sensation to the left lower extremity, weakness, numbness, and tingling.  She is currently ambulating unassisted but with a limping gait.  In the emergency department, no imaging was obtained, however a Doppler study was performed on the left lower extremity which was negative for DVT.  The patient was discharged with instructions to follow-up with her PCP and neurosurgery office.  The patient was given a Medrol Dosepak which she is currently taking.  Her symptoms still persist.

## 2023-01-26 ENCOUNTER — APPOINTMENT (OUTPATIENT)
Dept: NEUROSURGERY | Facility: CLINIC | Age: 58
End: 2023-01-26
Payer: COMMERCIAL

## 2023-01-26 DIAGNOSIS — M43.06 SPONDYLOLYSIS, LUMBAR REGION: ICD-10-CM

## 2023-01-26 DIAGNOSIS — M21.379 FOOT DROP, UNSPECIFIED FOOT: ICD-10-CM

## 2023-01-26 DIAGNOSIS — M51.16 INTERVERTEBRAL DISC DISORDERS WITH RADICULOPATHY, LUMBAR REGION: ICD-10-CM

## 2023-01-26 DIAGNOSIS — M48.062 SPINAL STENOSIS, LUMBAR REGION WITH NEUROGENIC CLAUDICATION: ICD-10-CM

## 2023-01-26 PROCEDURE — 99213 OFFICE O/P EST LOW 20 MIN: CPT

## 2023-01-26 RX ORDER — NAPROXEN 500 MG/1
500 TABLET ORAL
Qty: 60 | Refills: 1 | Status: ACTIVE | COMMUNITY
Start: 2023-01-26 | End: 1900-01-01

## 2023-01-26 RX ORDER — TRAMADOL HYDROCHLORIDE 50 MG/1
50 TABLET, COATED ORAL
Qty: 30 | Refills: 0 | Status: ACTIVE | COMMUNITY
Start: 2023-01-26 | End: 1900-01-01

## 2023-01-26 NOTE — ASSESSMENT
[FreeTextEntry1] : DIAGNOSIS: Lumbar stenosis L4-5 lumbar herniated disc L4-5 L5-S1 left left foot drop\par TREATMENT PLAN:  LUMBAR DECOMPRESSION WITH INSTRUMENTED STABILIZATION AND FUSION L4-S1\par \par This is a patient with lumbar herniated disc and a foot drop on the left.  She has extremely large disc herniation at L4-5 spondylolysis and disc herniation L4-5 L5-S1 causing compression of L4-L5 and S1 on the left.\par \par I have recommended lumbar decompression as a treatment option.  This entails removing the lamina and the medial facet joints along with the underlying hypertrophied ligamentum flavum.  This will allow for a widening of the spinal canal and the neuroforamen at the effected levels.  In doing so will likely result in added instability to the spine at this level requiring the need for instrumented stabilization and fusion.  This implies the use of pedicle screws and possibly interbody prosthetics to provide strength and anatomical alignment to the operated segments.  \par  \par Risks and benefits of surgery were described to the patient in detail which include but not excluding those otherwise not mentioned:  coma paralysis, death, stroke, spinal fluid leak, nerve injury, weakness, infection, hardware malfunction/failure/mal-positioning requiring re-operation, vascular injury, nonunion/pseudoarthrosis, adjacent segment degeneration, dysphonia/dysphagia and persistent pain.

## 2023-01-26 NOTE — PHYSICAL EXAM
[Antalgic] : antalgic [Pain / Temp Decrease Lateral Leg & Dorsum Of Foot Left Only] : L5 sensory impairment [Pain / Temp Decrease Sole Of Foot & Posterior Leg Bilateral] : S1 sensory impairment [2] : 2/5 Ankle Plantar Flexion (S1) [Able to toe walk] : the patient was not able to toe walk [Able to heel walk] : the patient was not able to heel walk

## 2023-01-26 NOTE — RESULTS/DATA
[FreeTextEntry1] : [facility] MRI [id number] reveals extremely large herniated disc at L4-5 with severe stenosis at this level.  There is a L5-S1 left-sided disc herniation as well likely extending superiorly causing essentially double crush and L5 and L5-S1.  Essentially L4-L5 and S1 on the left are being compromised.  There also appears to be spondylolysis at L5.

## 2023-01-26 NOTE — REASON FOR VISIT
[Follow-Up: _____] : a [unfilled] follow-up visit [Spouse] : spouse [FreeTextEntry1] : \shahnaz Rivera is here for a MRI review.  She is accompanied by her .  She is in rather severe pain and can barely walk.  She clearly has a left-sided foot drop.  She is walking with an antalgic gait and unable to stand for periods of time.

## 2023-01-27 ENCOUNTER — NON-APPOINTMENT (OUTPATIENT)
Age: 58
End: 2023-01-27

## 2023-01-27 ENCOUNTER — APPOINTMENT (OUTPATIENT)
Dept: CARDIOLOGY | Facility: CLINIC | Age: 58
End: 2023-01-27
Payer: COMMERCIAL

## 2023-01-27 VITALS
HEART RATE: 74 BPM | OXYGEN SATURATION: 95 % | SYSTOLIC BLOOD PRESSURE: 110 MMHG | TEMPERATURE: 97 F | WEIGHT: 200 LBS | HEIGHT: 64 IN | RESPIRATION RATE: 14 BRPM | BODY MASS INDEX: 34.15 KG/M2 | DIASTOLIC BLOOD PRESSURE: 72 MMHG

## 2023-01-27 DIAGNOSIS — Q21.1 ATRIAL SEPTAL DEFECT: ICD-10-CM

## 2023-01-27 DIAGNOSIS — R94.31 ABNORMAL ELECTROCARDIOGRAM [ECG] [EKG]: ICD-10-CM

## 2023-01-27 DIAGNOSIS — E78.00 PURE HYPERCHOLESTEROLEMIA, UNSPECIFIED: ICD-10-CM

## 2023-01-27 DIAGNOSIS — R94.39 ABNORMAL RESULT OF OTHER CARDIOVASCULAR FUNCTION STUDY: ICD-10-CM

## 2023-01-27 DIAGNOSIS — I10 ESSENTIAL (PRIMARY) HYPERTENSION: ICD-10-CM

## 2023-01-27 DIAGNOSIS — R06.02 SHORTNESS OF BREATH: ICD-10-CM

## 2023-01-27 PROCEDURE — 93000 ELECTROCARDIOGRAM COMPLETE: CPT

## 2023-01-27 PROCEDURE — 99214 OFFICE O/P EST MOD 30 MIN: CPT | Mod: 25

## 2023-01-27 RX ORDER — LISINOPRIL 10 MG/1
10 TABLET ORAL TWICE DAILY
Qty: 90 | Refills: 2 | Status: ACTIVE | COMMUNITY
Start: 1900-01-01 | End: 1900-01-01

## 2023-01-27 NOTE — DISCUSSION/SUMMARY
[FreeTextEntry1] : JUANCARLOS PELAYO is a 57 year old F \par \par History of asthma and history of PFO repair.  No history of major cardiovascular disease or cardiac events.  She has numbness and weakness in her left leg due to radiculopathy and nerve compression and is scheduled to have lower back fusion surgery with Dr. Rene\par \par Recently, she was able to go up 2 flight of steps without any difficulty.  Denies dyspnea, PND, pedal edema, chest pain, palpitations or dizziness.\par \par Hypercholestremia: Dietary modification and exercise program to reduce LDL was discussed. In September 2021, LDL was 102. Most recent . Target LDL less than 85.  \par Currently takes Pravachol for hypercholesterolemia.\par \par Hypertension. controlled.  Continue current medications.\par Non-pharmacological ways of reducing BP have been discussed.\par \par **PREOP:\par EKG today shows sinus rhythm.  LVH.  Nonspecific T waves in inferior leads.  Unchanged compared to previous EKG.  Reviewed previous stress testing and echocardiogram from May 2022.  There has not been any significant cardiovascular change by EKG and clinical evaluation.  Patient should be at mild to moderate risk for major cardiovascular events from anticipated low back surgery.  She can stop aspirin for 7 days prior to surgery to reduce bleeding complication if desired by neurosurgery.  It should be resumed postoperatively.  Should continue Pravachol, lisinopril and HCTZ without interruption.  She had blood draw for preop labs today and should be addressed as per surgery.  Further testing is unlikely to improve overall postoperative outcome.\par \par Thank you for this referral and allowing me to participate in the care of this patient.  If I can be of any further help or  if you have any questions, please do not hesitate to contact me\par \par \par Sincerely,\par \par Dandy Fisher MD, FACC, TINO\par \par

## 2023-01-27 NOTE — PHYSICAL EXAM
[Normal] : clear lung fields, good air entry, no respiratory distress [No Edema] : no edema [No Rash] : no rash [Alert and Oriented] : alert and oriented [Soft] : abdomen soft [de-identified] : Obese [de-identified] : Weakness in the left ankle.  Numbness in the left foot and below-knee

## 2023-01-27 NOTE — HISTORY OF PRESENT ILLNESS
[FreeTextEntry1] : Jailyn is a 57-year-old female with history of hypertension, dyslipidemia, overweight, PFO, asthma and GERD. \par \par Since last OV in May 2022, there have not been any hospitalizations or procedures. She denies chest pain, pressure, palpitations, unusual shortness of breath, denies REAGAN, orthopnea, LE edema, lightheadedness, dizziness, near syncope or syncope. \par \par Previously, she had EKG with T-wave inversions which are benign after evaluation by echocardiogram and stress test -May 2022\par \par History of PFO closure 2016; currently on aspirin.  History of TIA or CVA or syncope.\par \par Hypertension is  well controlled.  Currently on hydrochlorothiazide and lisinopril. \par \par \par

## 2023-01-27 NOTE — ASSESSMENT
[FreeTextEntry1] : Reviewed today 5/27/2022:\par -Labs: 4/2022 Total chol 181  HDL 46 Creat 0.82 K+ 4.2 Ast 18 Alt 18 \par -Nuclear Stress test: Exercise 6min 7 METS REAGAN that resolved with rest. EKGs non-diagnostic due to baseline abnormality. Nuclear Findings: Small mild defect in anteroseptal wall that is fixed- sugg of scar. Normal wall motion. Accuracy limited by breast attenuation.\par -ETT: 5/2/2022 Suggestive is ischemia by EKG due to depressions inferolaterally in recovery\par -ECHO 5/2/2022 EF 60% Mild MR, Normal LV systolic function and no seg. wall motion abnormalities. Ectopy noted Mild GA. Normal PASP. \par \par Previous testing:\par -EKG 8/3/18: Sinus rhythm. LVH. The inverted in inferior leads, V3 to V6. Previous EKG for comparison is not available.\par -Stress echocardiogram August 2018 was negative for ischemia with good levels of exercise.  PASP was normal.\par -Labs from September 2021 showed  and normal LFT and creatinine.  FBS 82.  A1c 5.6.  LDL was 116 in December 2021.  .  Normal LFT.  Normal TSH.  A1c 5.4.\par \par \par

## 2023-02-01 ENCOUNTER — APPOINTMENT (OUTPATIENT)
Dept: NEUROSURGERY | Facility: HOSPITAL | Age: 58
End: 2023-02-01

## 2023-02-02 ENCOUNTER — APPOINTMENT (OUTPATIENT)
Dept: NEUROSURGERY | Facility: CLINIC | Age: 58
End: 2023-02-02

## 2023-02-08 ENCOUNTER — APPOINTMENT (OUTPATIENT)
Dept: NEUROSURGERY | Facility: CLINIC | Age: 58
End: 2023-02-08
Payer: COMMERCIAL

## 2023-02-08 VITALS
HEART RATE: 75 BPM | SYSTOLIC BLOOD PRESSURE: 124 MMHG | DIASTOLIC BLOOD PRESSURE: 96 MMHG | OXYGEN SATURATION: 97 % | TEMPERATURE: 98.1 F

## 2023-02-08 PROCEDURE — 99024 POSTOP FOLLOW-UP VISIT: CPT

## 2023-02-08 NOTE — REASON FOR VISIT
[de-identified] : Complete decompresive laminectomy,L4,superior L5,diskectomy L4-5. [de-identified] : 02/01/2023 [de-identified] : 7 [de-identified] : 1 [de-identified] : Pt is here for her 1 week post op.

## 2023-02-08 NOTE — HISTORY OF PRESENT ILLNESS
[FreeTextEntry1] : patient presents today for follow up after L4-5 decompression and fusion on 2/1/2023\par patient reports improving pre-operative symptoms\par no new nt, weakness, balance issues, or bladder issues\par no longer taking narcotics

## 2023-02-08 NOTE — ASSESSMENT
[FreeTextEntry1] : patient presents today for follow up after L4-5 decompression and fusion on 2/1/2023\par patient reports improving pre-operative symptoms\par keep wound dry and covered for 1 more week\par discussed limitations\par fu 4 weeks for xrays no

## 2023-03-06 ENCOUNTER — APPOINTMENT (OUTPATIENT)
Dept: NEUROSURGERY | Facility: CLINIC | Age: 58
End: 2023-03-06
Payer: COMMERCIAL

## 2023-03-06 VITALS
SYSTOLIC BLOOD PRESSURE: 123 MMHG | WEIGHT: 200 LBS | OXYGEN SATURATION: 98 % | HEIGHT: 64 IN | TEMPERATURE: 97.8 F | HEART RATE: 72 BPM | DIASTOLIC BLOOD PRESSURE: 79 MMHG | BODY MASS INDEX: 34.15 KG/M2

## 2023-03-06 PROCEDURE — 99024 POSTOP FOLLOW-UP VISIT: CPT

## 2023-03-06 NOTE — HISTORY OF PRESENT ILLNESS
Patient Education        Tilt Table Test: About This Test  What is it? A tilt table test is used to check people who have fainted or who often feel lightheaded. The results help your doctor know the cause of your fainting or feeling lightheaded. The test uses a special table that slowly tilts you to an upright position. It checks how your body responds when you change positions. Why is this test done? This test checks what causes your symptoms by monitoring them while changing your position. Your doctor can see if you faint or feel lightheaded because of problems with your heart rate or blood pressure. When people move from a lying position to an upright one, their blood pressure normally drops. But the body adjusts to this. Your nervous system senses changes in body position and controls your heart rate and blood pressure. If the nervous system doesn't work properly, you might feel lightheaded or faint. This can happen if your blood pressure stays too low. Your heart rate also may slow down or speed up. You feel lightheaded because your brain is not getting a normal amount of blood for a short time. This problem is called syncope (say \"[id number]\"). Syncope might happen during the test when you change to an upright position. How can you prepare for the test?  · Tell your doctor about any medicines you take. Ask your doctor if you need to stop taking any medicines before the test.  · You may be asked to not eat or drink for a few hours before the test.  What happens during the test?  · The test is usually done in a hospital or a cardiologist's office. · You will have small patches or pads attached to your skin. These are sensors that monitor your heart. You will also have a blood pressure cuff on your arm. And you may have an IV. · During the test, you will lie flat on a table that can tilt you up to almost a standing position. You will be strapped securely to the table.   · Your heart rate and blood pressure are checked regularly as the table is tilted up. · You will be asked if you feel any symptoms like nausea, sweating, dizziness, or an abnormal heartbeat. If you don't have any symptoms, you may be given medicine to speed up your heart rate. Then you will be checked for symptoms again. · If you faint during the test, the table will be returned to a flat position. You will be checked closely and taken care of right away by your medical team. Most people wake up right away. What else should you know about the test?  · The test result is normal if your blood pressure stays stable during the test and you do not feel lightheaded or faint. The test result is not normal if your blood pressure drops and you feel lightheaded or faint. These symptoms might happen because of a slow heart rate. How long does the test take? · The test will take about an hour. It may take longer if you get medicine to speed up your heart during the test.  What happens after the test?  · Your heart rate and blood pressure will be checked before you go home. · You may need to have someone drive you home after the test.  · You can probably go back to your usual activities right away. But some people feel a little tired or nauseated  Follow-up care is a key part of your treatment and safety. Be sure to make and go to all appointments, and call your doctor if you are having problems. It's also a good idea to keep a list of the medicines you take. Ask your doctor when you can expect to have your test results. Where can you learn more? Go to http://cal-caiyt.info/. Enter B997 in the search box to learn more about \"Tilt Table Test: About This Test.\"  Current as of: April 9, 2019  Content Version: 12.2  © 2087-4586 [employer]. Care instructions adapted under license by [employer] (which disclaims liability or warranty for this information).  If you have questions about a medical condition or [FreeTextEntry1] : patient presents today for follow up after L4-5 decompression and fusion on 2/1/2023\par patient reports improving pre-operative symptoms\par no new nt, weakness, balance issues, or bladder issues\par no longer taking narcotics this instruction, always ask your healthcare professional. [place] [address] any warranty or liability for your use of this information. DISCHARGE SUMMARY from Nurse    PATIENT INSTRUCTIONS:    After general anesthesia or intravenous sedation, for 24 hours or while taking prescription Narcotics:  · Limit your activities  · Do not drive and operate hazardous machinery  · Do not make important personal or business decisions  · Do  not drink alcoholic beverages  · If you have not urinated within 8 hours after discharge, please contact your surgeon on call. Report the following to your surgeon:  · Excessive pain, swelling, redness or odor of or around the surgical area  · Temperature over 100.5  · Nausea and vomiting lasting longer than 4 hours or if unable to take medications  · Any signs of decreased circulation or nerve impairment to extremity: change in color, persistent  numbness, tingling, coldness or increase pain  · Any questions    What to do at Home:    These are general instructions for a healthy lifestyle:    No smoking/ No tobacco products/ Avoid exposure to second hand smoke  Surgeon General's Warning:  Quitting smoking now greatly reduces serious risk to your health. Obesity, smoking, and sedentary lifestyle greatly increases your risk for illness    A healthy diet, regular physical exercise & weight monitoring are important for maintaining a healthy lifestyle    You may be retaining fluid if you have a history of heart failure or if you experience any of the following symptoms:  Weight gain of 3 pounds or more overnight or 5 pounds in a week, increased swelling in our hands or feet or shortness of breath while lying flat in bed. Please call your doctor as soon as you notice any of these symptoms; do not wait until your next office visit. The discharge information has been reviewed with the patient. The patient verbalized understanding.   Discharge medications reviewed with the patient and appropriate educational materials and side effects teaching were provided. ___________________________________________________________________________________________________________________________________  Patient armband removed and given to patient to take home.   Patient was informed of the privacy risks if armband lost or stolen

## 2023-03-06 NOTE — REASON FOR VISIT
[de-identified] : 02/01/23 [de-identified] : 4 [de-identified] : Pt is here for her 4 week post op.

## 2023-03-06 NOTE — ASSESSMENT
[FreeTextEntry1] : patient presents today for follow up after L4-5 decompression and fusion on 2/1/2023\par wound cleaned and redressed\par dressing supplies and instructions given to patient and shahida\par fu 1 week for wound check\par

## 2023-03-07 ENCOUNTER — TRANSCRIPTION ENCOUNTER (OUTPATIENT)
Age: 58
End: 2023-03-07

## 2023-03-10 ENCOUNTER — ASC (OUTPATIENT)
Dept: URBAN - METROPOLITAN AREA SURGERY 8 | Facility: SURGERY | Age: 58
Setting detail: OPHTHALMOLOGY
End: 2023-03-10
Payer: COMMERCIAL

## 2023-03-10 DIAGNOSIS — H43.392: ICD-10-CM

## 2023-03-10 PROCEDURE — 67036 REMOVAL OF INNER EYE FLUID: CPT | Performed by: OPHTHALMOLOGY

## 2023-03-13 ENCOUNTER — OFFICE (OUTPATIENT)
Dept: URBAN - METROPOLITAN AREA CLINIC 105 | Facility: CLINIC | Age: 58
Setting detail: OPHTHALMOLOGY
End: 2023-03-13
Payer: COMMERCIAL

## 2023-03-13 ENCOUNTER — APPOINTMENT (OUTPATIENT)
Dept: NEUROSURGERY | Facility: CLINIC | Age: 58
End: 2023-03-13
Payer: COMMERCIAL

## 2023-03-13 ENCOUNTER — RX ONLY (RX ONLY)
Age: 58
End: 2023-03-13

## 2023-03-13 DIAGNOSIS — H43.392: ICD-10-CM

## 2023-03-13 PROCEDURE — 99024 POSTOP FOLLOW-UP VISIT: CPT

## 2023-03-13 PROCEDURE — 99024 POSTOP FOLLOW-UP VISIT: CPT | Performed by: OPHTHALMOLOGY

## 2023-03-13 ASSESSMENT — KERATOMETRY
OS_K1POWER_DIOPTERS: 46.00
OD_AXISANGLE_DEGREES: 162
OS_K2POWER_DIOPTERS: 46.25
OS_AXISANGLE_DEGREES: 178
OD_K2POWER_DIOPTERS: 46.00
OD_K1POWER_DIOPTERS: 44.00

## 2023-03-13 ASSESSMENT — REFRACTION_MANIFEST
OD_ADD: +2.00
OD_SPHERE: +0.25
OD_VA1: 20/20
OD_CYLINDER: -1.25
OD_AXIS: 075
OS_VA1: 20/20
OS_ADD: +2.00
OS_SPHERE: -1.00
OS_AXIS: 085
OS_CYLINDER: -0.75

## 2023-03-13 ASSESSMENT — REFRACTION_CURRENTRX
OS_VPRISM_DIRECTION: PROGS
OS_OVR_VA: 20/
OD_AXIS: 062
OS_AXIS: 107
OD_SPHERE: -8.75
OD_ADD: +2.00
OS_SPHERE: -8.75
OS_ADD: +2.00
OD_CYLINDER: -1.75
OS_CYLINDER: -1.75
OD_VPRISM_DIRECTION: PROGS
OD_OVR_VA: 20/

## 2023-03-13 ASSESSMENT — CORNEAL EDEMA CLINICAL DESCRIPTION
OD_CORNEALEDEMA: ABSENT
OS_CORNEALEDEMA: ABSENT

## 2023-03-13 ASSESSMENT — SPHEQUIV_DERIVED
OS_SPHEQUIV: -1.375
OS_SPHEQUIV: -1.375
OD_SPHEQUIV: -0.375
OD_SPHEQUIV: -0.375

## 2023-03-13 ASSESSMENT — AXIALLENGTH_DERIVED
OD_AL: 23.1939
OS_AL: 23.1711
OD_AL: 23.1939
OS_AL: 23.1711

## 2023-03-13 ASSESSMENT — REFRACTION_AUTOREFRACTION
OD_SPHERE: +0.25
OS_AXIS: 084
OS_CYLINDER: -0.75
OS_SPHERE: -1.00
OD_AXIS: 075
OD_CYLINDER: -1.25

## 2023-03-13 ASSESSMENT — VISUAL ACUITY
OS_BCVA: 20/25-1
OD_BCVA: 20/25-1

## 2023-03-13 ASSESSMENT — CONFRONTATIONAL VISUAL FIELD TEST (CVF)
OD_FINDINGS: FULL
OS_FINDINGS: FULL

## 2023-03-13 ASSESSMENT — TONOMETRY: OS_IOP_MMHG: 17

## 2023-03-13 NOTE — ASSESSMENT
[FreeTextEntry1] : patient presents today for follow up after L4-5 decompression and fusion on 2/1/2023\par wound cleaned and redressed\par dressing supplies and instructions given to patient and \par fu 1 week for wound check\par

## 2023-03-20 ENCOUNTER — APPOINTMENT (OUTPATIENT)
Dept: NEUROSURGERY | Facility: CLINIC | Age: 58
End: 2023-03-20
Payer: COMMERCIAL

## 2023-03-20 VITALS — BODY MASS INDEX: 33.46 KG/M2 | WEIGHT: 196 LBS | HEIGHT: 64 IN

## 2023-03-20 PROCEDURE — 99024 POSTOP FOLLOW-UP VISIT: CPT

## 2023-03-30 ENCOUNTER — APPOINTMENT (OUTPATIENT)
Dept: NEUROSURGERY | Facility: CLINIC | Age: 58
End: 2023-03-30
Payer: COMMERCIAL

## 2023-03-30 PROCEDURE — 99024 POSTOP FOLLOW-UP VISIT: CPT

## 2023-03-30 NOTE — ASSESSMENT
[FreeTextEntry1] : patient presents today for follow up after L4-5 decompression and fusion on 2/1/2023\par wound cleaned and redressed\par fu 2 week for wound check\par

## 2023-03-30 NOTE — REASON FOR VISIT
[de-identified] : L4-5 DECOMPRESSION AND FUSION [de-identified] : 2/1/23 [de-identified] : 6 [de-identified] : Pt is here for her 6 week post op.

## 2023-03-30 NOTE — REASON FOR VISIT
[de-identified] : L4-5 DECOMPRESSION AND FUSION [de-identified] : 2/1/23 [de-identified] : 6 [de-identified] : Pt is here for her 6 week post op.

## 2023-04-14 ENCOUNTER — OFFICE (OUTPATIENT)
Dept: URBAN - METROPOLITAN AREA CLINIC 105 | Facility: CLINIC | Age: 58
Setting detail: OPHTHALMOLOGY
End: 2023-04-14
Payer: COMMERCIAL

## 2023-04-14 ENCOUNTER — APPOINTMENT (OUTPATIENT)
Dept: NEUROSURGERY | Facility: CLINIC | Age: 58
End: 2023-04-14
Payer: COMMERCIAL

## 2023-04-14 VITALS — WEIGHT: 196 LBS | BODY MASS INDEX: 33.46 KG/M2 | HEIGHT: 64 IN

## 2023-04-14 DIAGNOSIS — H43.392: ICD-10-CM

## 2023-04-14 PROCEDURE — 99024 POSTOP FOLLOW-UP VISIT: CPT | Performed by: OPHTHALMOLOGY

## 2023-04-14 PROCEDURE — 99024 POSTOP FOLLOW-UP VISIT: CPT

## 2023-04-14 ASSESSMENT — CORNEAL EDEMA CLINICAL DESCRIPTION
OS_CORNEALEDEMA: ABSENT
OD_CORNEALEDEMA: ABSENT

## 2023-04-14 ASSESSMENT — KERATOMETRY
OD_K1POWER_DIOPTERS: 44.00
OD_K2POWER_DIOPTERS: 46.00
OD_AXISANGLE_DEGREES: 162
OS_AXISANGLE_DEGREES: 178
OS_K1POWER_DIOPTERS: 46.00
OS_K2POWER_DIOPTERS: 46.25

## 2023-04-14 ASSESSMENT — REFRACTION_AUTOREFRACTION
OD_SPHERE: +0.25
OD_AXIS: 075
OS_CYLINDER: -0.75
OS_SPHERE: -1.00
OD_CYLINDER: -1.25
OS_AXIS: 084

## 2023-04-14 ASSESSMENT — SPHEQUIV_DERIVED
OS_SPHEQUIV: -1.375
OD_SPHEQUIV: -0.375

## 2023-04-14 ASSESSMENT — CONFRONTATIONAL VISUAL FIELD TEST (CVF)
OD_FINDINGS: FULL
OS_FINDINGS: FULL

## 2023-04-14 ASSESSMENT — AXIALLENGTH_DERIVED
OD_AL: 23.1939
OS_AL: 23.1711

## 2023-04-14 ASSESSMENT — TONOMETRY: OS_IOP_MMHG: 18

## 2023-04-14 ASSESSMENT — VISUAL ACUITY
OS_BCVA: 20/25+
OD_BCVA: 20/25

## 2023-04-14 NOTE — REASON FOR VISIT
[de-identified] : L4-5 DECOMPRESSION AND FUSION [de-identified] : 2/1/23 [de-identified] : 6 [de-identified] : Pt is here for her 10 week post op.

## 2023-04-14 NOTE — ASSESSMENT
[FreeTextEntry1] : patient presents today for follow up after L4-5 decompression and fusion on 2/1/2023\par wound healed\par follow up in 13 weeks with xrays \par

## 2023-06-02 ENCOUNTER — RX RENEWAL (OUTPATIENT)
Age: 58
End: 2023-06-02

## 2023-06-05 DIAGNOSIS — R73.09 OTHER ABNORMAL GLUCOSE: ICD-10-CM

## 2023-06-07 ENCOUNTER — NON-APPOINTMENT (OUTPATIENT)
Age: 58
End: 2023-06-07

## 2023-06-30 ENCOUNTER — RX RENEWAL (OUTPATIENT)
Age: 58
End: 2023-06-30

## 2023-06-30 RX ORDER — PRAVASTATIN SODIUM 20 MG/1
20 TABLET ORAL
Qty: 90 | Refills: 1 | Status: ACTIVE | COMMUNITY
Start: 2022-06-06 | End: 1900-01-01

## 2023-07-10 ENCOUNTER — APPOINTMENT (OUTPATIENT)
Dept: NEUROSURGERY | Facility: CLINIC | Age: 58
End: 2023-07-10

## 2023-07-20 ENCOUNTER — NON-APPOINTMENT (OUTPATIENT)
Age: 58
End: 2023-07-20

## 2023-08-11 ENCOUNTER — APPOINTMENT (OUTPATIENT)
Dept: NEUROSURGERY | Facility: CLINIC | Age: 58
End: 2023-08-11
Payer: COMMERCIAL

## 2023-08-11 VITALS — DIASTOLIC BLOOD PRESSURE: 72 MMHG | SYSTOLIC BLOOD PRESSURE: 122 MMHG

## 2023-08-11 DIAGNOSIS — Z85.9 PERSONAL HISTORY OF MALIGNANT NEOPLASM, UNSPECIFIED: ICD-10-CM

## 2023-08-11 DIAGNOSIS — H81.399 OTHER PERIPHERAL VERTIGO, UNSPECIFIED EAR: ICD-10-CM

## 2023-08-11 PROCEDURE — 99213 OFFICE O/P EST LOW 20 MIN: CPT

## 2023-08-11 NOTE — REASON FOR VISIT
[Follow-Up: _____] : a [unfilled] follow-up visit [FreeTextEntry1] : Pt 59 y/o F presents in office for a F/U visit no other complaints [de-identified] : L4-5 DECOMPRESSION AND FUSION [de-identified] : 2/1/23 [de-identified] : 6 [de-identified] : Pt is here for her 10 week post op.

## 2023-08-11 NOTE — HISTORY OF PRESENT ILLNESS
[FreeTextEntry1] : patient presents today for follow up after L4-5 decompression and fusion on 2/1/2023 patient reports improving pre-operative symptoms no new nt, weakness, balance issues, or bladder issues no longer taking narcotics  sadly patient has been dx with brain tumors has had right craniotomy for one tumor and is having radiation and chemo for a brainstem lesion via msk

## 2023-08-11 NOTE — ASSESSMENT
[FreeTextEntry1] : patient presents today for follow up after L4-5 decompression and fusion on 2/1/2023 wound healed follow up 1 year for final post op xray

## 2023-09-11 ENCOUNTER — OFFICE (OUTPATIENT)
Dept: URBAN - METROPOLITAN AREA CLINIC 105 | Facility: CLINIC | Age: 58
Setting detail: OPHTHALMOLOGY
End: 2023-09-11
Payer: COMMERCIAL

## 2023-09-11 DIAGNOSIS — H43.393: ICD-10-CM

## 2023-09-11 PROCEDURE — 92012 INTRM OPH EXAM EST PATIENT: CPT | Performed by: OPHTHALMOLOGY

## 2023-09-11 PROCEDURE — 92134 CPTRZ OPH DX IMG PST SGM RTA: CPT | Performed by: OPHTHALMOLOGY

## 2023-09-11 ASSESSMENT — AXIALLENGTH_DERIVED
OS_AL: 23.1711
OD_AL: 23.1939

## 2023-09-11 ASSESSMENT — SPHEQUIV_DERIVED
OD_SPHEQUIV: -0.375
OS_SPHEQUIV: -1.375

## 2023-09-11 ASSESSMENT — KERATOMETRY
OD_K1POWER_DIOPTERS: 44.00
OD_K2POWER_DIOPTERS: 46.00
OS_K1POWER_DIOPTERS: 46.00
OD_AXISANGLE_DEGREES: 162
OS_AXISANGLE_DEGREES: 178
OS_K2POWER_DIOPTERS: 46.25

## 2023-09-11 ASSESSMENT — REFRACTION_AUTOREFRACTION
OD_AXIS: 075
OS_AXIS: 084
OS_CYLINDER: -0.75
OD_SPHERE: +0.25
OD_CYLINDER: -1.25
OS_SPHERE: -1.00

## 2023-09-11 ASSESSMENT — TONOMETRY
OS_IOP_MMHG: 17
OD_IOP_MMHG: 16

## 2023-09-11 ASSESSMENT — CORNEAL EDEMA CLINICAL DESCRIPTION
OD_CORNEALEDEMA: ABSENT
OS_CORNEALEDEMA: ABSENT

## 2023-09-11 ASSESSMENT — VISUAL ACUITY
OS_BCVA: 20/40-2
OD_BCVA: 20/80-1

## 2023-09-11 ASSESSMENT — CONFRONTATIONAL VISUAL FIELD TEST (CVF)
OS_FINDINGS: FULL
OD_FINDINGS: FULL

## 2023-09-29 ENCOUNTER — ASC (OUTPATIENT)
Dept: URBAN - METROPOLITAN AREA SURGERY 8 | Facility: SURGERY | Age: 58
Setting detail: OPHTHALMOLOGY
End: 2023-09-29
Payer: COMMERCIAL

## 2023-09-29 DIAGNOSIS — H43.391: ICD-10-CM

## 2023-09-29 PROCEDURE — 67036 REMOVAL OF INNER EYE FLUID: CPT | Performed by: OPHTHALMOLOGY

## 2023-10-03 ENCOUNTER — OFFICE (OUTPATIENT)
Dept: URBAN - METROPOLITAN AREA CLINIC 105 | Facility: CLINIC | Age: 58
Setting detail: OPHTHALMOLOGY
End: 2023-10-03
Payer: COMMERCIAL

## 2023-10-03 ENCOUNTER — RX ONLY (RX ONLY)
Age: 58
End: 2023-10-03

## 2023-10-03 DIAGNOSIS — H43.811: ICD-10-CM

## 2023-10-03 PROCEDURE — 99024 POSTOP FOLLOW-UP VISIT: CPT | Performed by: OPTOMETRIST

## 2023-10-03 ASSESSMENT — REFRACTION_AUTOREFRACTION
OS_AXIS: 072
OS_SPHERE: -1.00
OS_CYLINDER: -0.50
OD_CYLINDER: -1.50
OD_SPHERE: +0.75
OD_AXIS: 081

## 2023-10-03 ASSESSMENT — KERATOMETRY
OD_K2POWER_DIOPTERS: 46.00
OS_K2POWER_DIOPTERS: 46.00
OD_AXISANGLE_DEGREES: 167
OD_K1POWER_DIOPTERS: 45.00
OS_AXISANGLE_DEGREES: 090
OS_K1POWER_DIOPTERS: 46.00

## 2023-10-03 ASSESSMENT — VISUAL ACUITY
OS_BCVA: 20/30-
OD_BCVA: 20/40-2

## 2023-10-03 ASSESSMENT — SPHEQUIV_DERIVED
OS_SPHEQUIV: -1.25
OD_SPHEQUIV: 0

## 2023-10-03 ASSESSMENT — TONOMETRY
OS_IOP_MMHG: 17
OD_IOP_MMHG: 17

## 2023-10-03 ASSESSMENT — CONFRONTATIONAL VISUAL FIELD TEST (CVF)
OD_FINDINGS: FULL
OS_FINDINGS: FULL

## 2023-10-03 ASSESSMENT — AXIALLENGTH_DERIVED
OD_AL: 22.8795
OS_AL: 23.1684

## 2023-12-20 ENCOUNTER — OFFICE (OUTPATIENT)
Dept: URBAN - METROPOLITAN AREA CLINIC 105 | Facility: CLINIC | Age: 58
Setting detail: OPHTHALMOLOGY
End: 2023-12-20
Payer: COMMERCIAL

## 2023-12-20 DIAGNOSIS — H43.811: ICD-10-CM

## 2023-12-20 PROCEDURE — 99024 POSTOP FOLLOW-UP VISIT: CPT | Performed by: OPHTHALMOLOGY

## 2023-12-20 PROCEDURE — 92134 CPTRZ OPH DX IMG PST SGM RTA: CPT | Performed by: OPHTHALMOLOGY

## 2023-12-20 ASSESSMENT — REFRACTION_AUTOREFRACTION
OS_CYLINDER: -0.50
OS_AXIS: 072
OD_AXIS: 081
OD_SPHERE: +0.75
OD_CYLINDER: -1.50
OS_SPHERE: -1.00

## 2023-12-20 ASSESSMENT — CONFRONTATIONAL VISUAL FIELD TEST (CVF)
OD_FINDINGS: FULL
OS_FINDINGS: FULL

## 2023-12-20 ASSESSMENT — SPHEQUIV_DERIVED
OD_SPHEQUIV: 0
OS_SPHEQUIV: -1.25

## 2024-02-16 ENCOUNTER — APPOINTMENT (OUTPATIENT)
Dept: NEUROSURGERY | Facility: CLINIC | Age: 59
End: 2024-02-16
Payer: COMMERCIAL

## 2024-02-16 DIAGNOSIS — Z98.1 ARTHRODESIS STATUS: ICD-10-CM

## 2024-02-16 PROCEDURE — 99213 OFFICE O/P EST LOW 20 MIN: CPT

## 2024-02-16 PROCEDURE — 72100 X-RAY EXAM L-S SPINE 2/3 VWS: CPT

## 2024-02-16 NOTE — REASON FOR VISIT
[Follow-Up: _____] : a [unfilled] follow-up visit [FreeTextEntry1] : Pt 59 y/o F presents in office for a F/U visit no other complaints [de-identified] : L4-5 DECOMPRESSION AND FUSION [de-identified] : 2/1/23 [de-identified] : 6 [de-identified] : Pt is here for her 10 week post op.

## 2024-02-16 NOTE — ASSESSMENT
[FreeTextEntry1] : patient presents today for follow up after L4-5 decompression and fusion on 2/1/2023 doing well  follow up as needed  Will attempt trial of physical therapy have provided a script to the patient

## 2024-09-11 NOTE — PHYSICAL EXAM
Bed: 30  Expected date:   Expected time:   Means of arrival: [employer]  Comments:  DP- ABD PAIN  
[Intact] : all sensory within normal limits bilaterally

## 2024-09-23 ENCOUNTER — OFFICE (OUTPATIENT)
Dept: URBAN - METROPOLITAN AREA CLINIC 105 | Facility: CLINIC | Age: 59
Setting detail: OPHTHALMOLOGY
End: 2024-09-23
Payer: COMMERCIAL

## 2024-09-23 DIAGNOSIS — H43.811: ICD-10-CM

## 2024-09-23 PROCEDURE — 92134 CPTRZ OPH DX IMG PST SGM RTA: CPT | Performed by: OPHTHALMOLOGY

## 2024-09-23 PROCEDURE — 92014 COMPRE OPH EXAM EST PT 1/>: CPT | Performed by: OPHTHALMOLOGY

## 2024-09-23 ASSESSMENT — CONFRONTATIONAL VISUAL FIELD TEST (CVF)
OS_FINDINGS: FULL
OD_FINDINGS: FULL

## 2024-10-24 ENCOUNTER — OFFICE (OUTPATIENT)
Dept: URBAN - METROPOLITAN AREA CLINIC 105 | Facility: CLINIC | Age: 59
Setting detail: OPHTHALMOLOGY
End: 2024-10-24
Payer: COMMERCIAL

## 2024-10-24 DIAGNOSIS — H26.493: ICD-10-CM

## 2024-10-24 DIAGNOSIS — H43.811: ICD-10-CM

## 2024-10-24 PROCEDURE — 99214 OFFICE O/P EST MOD 30 MIN: CPT | Performed by: STUDENT IN AN ORGANIZED HEALTH CARE EDUCATION/TRAINING PROGRAM

## 2024-10-24 ASSESSMENT — VISUAL ACUITY
OD_BCVA: 20/80
OS_BCVA: 20/25-2

## 2024-10-24 ASSESSMENT — REFRACTION_CURRENTRX
OS_OVR_VA: 20/
OD_SPHERE: +2.00
OD_OVR_VA: 20/
OS_SPHERE: +2.00

## 2024-10-24 ASSESSMENT — TONOMETRY
OS_IOP_MMHG: 18
OD_IOP_MMHG: 16

## 2024-10-24 ASSESSMENT — KERATOMETRY
OS_K1POWER_DIOPTERS: 46.00
OD_K1POWER_DIOPTERS: 44.50
OS_AXISANGLE_DEGREES: 090
OD_AXISANGLE_DEGREES: 170
OD_K2POWER_DIOPTERS: 46.00
OS_K2POWER_DIOPTERS: 46.00

## 2024-10-24 ASSESSMENT — CONFRONTATIONAL VISUAL FIELD TEST (CVF)
OD_FINDINGS: FULL
OS_FINDINGS: FULL

## 2024-10-24 ASSESSMENT — REFRACTION_AUTOREFRACTION
OD_AXIS: 083
OS_SPHERE: -1.00
OS_AXIS: 079
OS_CYLINDER: -0.75
OD_CYLINDER: -1.25
OD_SPHERE: +0.50

## 2024-11-27 ENCOUNTER — RX ONLY (RX ONLY)
Age: 59
End: 2024-11-27

## 2024-11-27 ENCOUNTER — OFFICE (OUTPATIENT)
Dept: URBAN - METROPOLITAN AREA CLINIC 105 | Facility: CLINIC | Age: 59
Setting detail: OPHTHALMOLOGY
End: 2024-11-27
Payer: COMMERCIAL

## 2024-11-27 DIAGNOSIS — H26.492: ICD-10-CM

## 2024-11-27 PROCEDURE — 66821 AFTER CATARACT LASER SURGERY: CPT | Mod: LT | Performed by: STUDENT IN AN ORGANIZED HEALTH CARE EDUCATION/TRAINING PROGRAM

## 2024-11-27 ASSESSMENT — KERATOMETRY
OD_K1POWER_DIOPTERS: 44.50
OS_K1POWER_DIOPTERS: 46.00
OS_AXISANGLE_DEGREES: 090
OD_AXISANGLE_DEGREES: 170
OS_K2POWER_DIOPTERS: 46.00
OD_K2POWER_DIOPTERS: 46.00

## 2024-11-27 ASSESSMENT — REFRACTION_AUTOREFRACTION
OS_CYLINDER: -0.75
OD_CYLINDER: -1.25
OD_SPHERE: +0.50
OD_AXIS: 083
OS_AXIS: 079
OS_SPHERE: -1.00

## 2024-11-27 ASSESSMENT — CONFRONTATIONAL VISUAL FIELD TEST (CVF)
OD_FINDINGS: FULL
OS_FINDINGS: FULL

## 2024-11-27 ASSESSMENT — REFRACTION_CURRENTRX
OS_OVR_VA: 20/
OD_OVR_VA: 20/
OS_SPHERE: +2.00
OD_SPHERE: +2.00

## 2024-11-27 ASSESSMENT — VISUAL ACUITY
OD_BCVA: 20/80
OS_BCVA: 20/25-2

## 2024-12-04 ENCOUNTER — RX ONLY (RX ONLY)
Age: 59
End: 2024-12-04

## 2024-12-04 ENCOUNTER — OFFICE (OUTPATIENT)
Dept: URBAN - METROPOLITAN AREA CLINIC 105 | Facility: CLINIC | Age: 59
Setting detail: OPHTHALMOLOGY
End: 2024-12-04
Payer: COMMERCIAL

## 2024-12-04 DIAGNOSIS — H26.491: ICD-10-CM

## 2024-12-04 PROCEDURE — 66821 AFTER CATARACT LASER SURGERY: CPT | Mod: 79,RT | Performed by: STUDENT IN AN ORGANIZED HEALTH CARE EDUCATION/TRAINING PROGRAM

## 2024-12-04 ASSESSMENT — KERATOMETRY
OD_K2POWER_DIOPTERS: 46.00
OS_AXISANGLE_DEGREES: 090
OD_AXISANGLE_DEGREES: 170
OD_K1POWER_DIOPTERS: 44.50
OS_K1POWER_DIOPTERS: 46.00
OS_K2POWER_DIOPTERS: 46.00

## 2024-12-04 ASSESSMENT — REFRACTION_CURRENTRX
OS_SPHERE: +2.00
OD_SPHERE: +2.00
OS_OVR_VA: 20/
OD_OVR_VA: 20/

## 2024-12-04 ASSESSMENT — REFRACTION_AUTOREFRACTION
OS_SPHERE: -1.00
OD_CYLINDER: -1.25
OS_AXIS: 079
OD_AXIS: 083
OD_SPHERE: +0.50
OS_CYLINDER: -0.75

## 2024-12-04 ASSESSMENT — VISUAL ACUITY
OD_BCVA: 20/80
OS_BCVA: 20/25-2

## 2025-01-14 ENCOUNTER — OFFICE (OUTPATIENT)
Dept: URBAN - METROPOLITAN AREA CLINIC 105 | Facility: CLINIC | Age: 60
Setting detail: OPHTHALMOLOGY
End: 2025-01-14
Payer: COMMERCIAL

## 2025-01-14 DIAGNOSIS — C71.9: ICD-10-CM

## 2025-01-14 DIAGNOSIS — H26.491: ICD-10-CM

## 2025-01-14 PROCEDURE — 99024 POSTOP FOLLOW-UP VISIT: CPT | Performed by: STUDENT IN AN ORGANIZED HEALTH CARE EDUCATION/TRAINING PROGRAM

## 2025-01-14 ASSESSMENT — KERATOMETRY
OD_K2POWER_DIOPTERS: 46.00
OS_AXISANGLE_DEGREES: 169
OS_K2POWER_DIOPTERS: 46.25
OD_AXISANGLE_DEGREES: 170
OD_K1POWER_DIOPTERS: 45.00
OS_K1POWER_DIOPTERS: 45.75

## 2025-01-14 ASSESSMENT — TONOMETRY
OS_IOP_MMHG: 19
OD_IOP_MMHG: 19

## 2025-01-14 ASSESSMENT — REFRACTION_AUTOREFRACTION
OD_CYLINDER: -1.25
OD_AXIS: 083
OD_SPHERE: +0.25
OS_SPHERE: -0.75
OS_AXIS: 078
OS_CYLINDER: -1.25

## 2025-01-14 ASSESSMENT — REFRACTION_CURRENTRX
OD_SPHERE: +2.00
OD_OVR_VA: 20/
OS_SPHERE: +2.00
OS_OVR_VA: 20/

## 2025-01-14 ASSESSMENT — VISUAL ACUITY
OS_BCVA: 20/30+
OD_BCVA: 20/80

## 2025-01-28 ENCOUNTER — OFFICE (OUTPATIENT)
Dept: URBAN - METROPOLITAN AREA CLINIC 113 | Facility: CLINIC | Age: 60
Setting detail: OPHTHALMOLOGY
End: 2025-01-28
Payer: COMMERCIAL

## 2025-01-28 DIAGNOSIS — C71.9: ICD-10-CM

## 2025-01-28 PROCEDURE — 92083 EXTENDED VISUAL FIELD XM: CPT | Performed by: STUDENT IN AN ORGANIZED HEALTH CARE EDUCATION/TRAINING PROGRAM

## 2025-01-28 PROCEDURE — 99024 POSTOP FOLLOW-UP VISIT: CPT | Performed by: STUDENT IN AN ORGANIZED HEALTH CARE EDUCATION/TRAINING PROGRAM

## 2025-01-28 PROCEDURE — 92133 CPTRZD OPH DX IMG PST SGM ON: CPT | Performed by: STUDENT IN AN ORGANIZED HEALTH CARE EDUCATION/TRAINING PROGRAM

## 2025-01-28 ASSESSMENT — KERATOMETRY
OS_K1POWER_DIOPTERS: 45.75
OS_AXISANGLE_DEGREES: 046
OD_AXISANGLE_DEGREES: 169
OD_K2POWER_DIOPTERS: 45.75
OD_K1POWER_DIOPTERS: 44.75
OS_K2POWER_DIOPTERS: 46.00

## 2025-01-28 ASSESSMENT — REFRACTION_AUTOREFRACTION
OD_SPHERE: +0.50
OS_SPHERE: -1.00
OS_AXIS: 080
OD_AXIS: 086
OS_CYLINDER: -0.75
OD_CYLINDER: -1.50

## 2025-01-28 ASSESSMENT — REFRACTION_CURRENTRX
OD_OVR_VA: 20/
OS_OVR_VA: 20/
OD_SPHERE: +2.00
OS_SPHERE: +2.00

## 2025-01-28 ASSESSMENT — TONOMETRY
OS_IOP_MMHG: 17
OD_IOP_MMHG: 17

## 2025-01-28 ASSESSMENT — VISUAL ACUITY
OS_BCVA: 20/30+
OD_BCVA: 20/50

## 2025-03-17 ENCOUNTER — APPOINTMENT (OUTPATIENT)
Dept: CARDIOLOGY | Facility: CLINIC | Age: 60
End: 2025-03-17

## 2025-03-24 ENCOUNTER — APPOINTMENT (OUTPATIENT)
Dept: CARDIOLOGY | Facility: CLINIC | Age: 60
End: 2025-03-24
Payer: COMMERCIAL

## 2025-03-24 VITALS
SYSTOLIC BLOOD PRESSURE: 116 MMHG | BODY MASS INDEX: 34.31 KG/M2 | WEIGHT: 201 LBS | RESPIRATION RATE: 14 BRPM | OXYGEN SATURATION: 98 % | HEART RATE: 65 BPM | HEIGHT: 64 IN | DIASTOLIC BLOOD PRESSURE: 78 MMHG

## 2025-03-24 DIAGNOSIS — R94.31 ABNORMAL ELECTROCARDIOGRAM [ECG] [EKG]: ICD-10-CM

## 2025-03-24 DIAGNOSIS — E78.00 PURE HYPERCHOLESTEROLEMIA, UNSPECIFIED: ICD-10-CM

## 2025-03-24 DIAGNOSIS — I10 ESSENTIAL (PRIMARY) HYPERTENSION: ICD-10-CM

## 2025-03-24 PROCEDURE — 93000 ELECTROCARDIOGRAM COMPLETE: CPT

## 2025-03-24 PROCEDURE — 99213 OFFICE O/P EST LOW 20 MIN: CPT

## 2025-03-24 RX ORDER — LISINOPRIL 10 MG/1
10 TABLET ORAL TWICE DAILY
Qty: 90 | Refills: 3 | Status: ACTIVE | COMMUNITY

## 2025-03-24 RX ORDER — PRAVASTATIN SODIUM 20 MG/1
20 TABLET ORAL
Qty: 90 | Refills: 3 | Status: ACTIVE | COMMUNITY

## 2025-04-02 ENCOUNTER — APPOINTMENT (OUTPATIENT)
Dept: NEUROSURGERY | Facility: CLINIC | Age: 60
End: 2025-04-02
Payer: COMMERCIAL

## 2025-04-02 PROCEDURE — 72100 X-RAY EXAM L-S SPINE 2/3 VWS: CPT

## 2025-04-02 PROCEDURE — 99213 OFFICE O/P EST LOW 20 MIN: CPT

## 2025-04-02 RX ORDER — OXYCODONE AND ACETAMINOPHEN 5; 325 MG/1; MG/1
5-325 TABLET ORAL
Qty: 60 | Refills: 0 | Status: ACTIVE | COMMUNITY
Start: 2025-04-02 | End: 1900-01-01

## 2025-04-09 ENCOUNTER — NON-APPOINTMENT (OUTPATIENT)
Age: 60
End: 2025-04-09

## 2025-04-18 ENCOUNTER — APPOINTMENT (OUTPATIENT)
Dept: NEUROSURGERY | Facility: CLINIC | Age: 60
End: 2025-04-18
Payer: COMMERCIAL

## 2025-04-18 DIAGNOSIS — M21.379 FOOT DROP, UNSPECIFIED FOOT: ICD-10-CM

## 2025-04-18 DIAGNOSIS — Z98.1 ARTHRODESIS STATUS: ICD-10-CM

## 2025-04-18 PROCEDURE — 99214 OFFICE O/P EST MOD 30 MIN: CPT

## 2025-04-28 ENCOUNTER — APPOINTMENT (OUTPATIENT)
Dept: ORTHOPEDIC SURGERY | Facility: CLINIC | Age: 60
End: 2025-04-28
Payer: COMMERCIAL

## 2025-04-28 VITALS — WEIGHT: 200 LBS | BODY MASS INDEX: 34.15 KG/M2 | HEIGHT: 64 IN

## 2025-04-28 DIAGNOSIS — Z85.9 PERSONAL HISTORY OF MALIGNANT NEOPLASM, UNSPECIFIED: ICD-10-CM

## 2025-04-28 DIAGNOSIS — M54.16 RADICULOPATHY, LUMBAR REGION: ICD-10-CM

## 2025-04-28 DIAGNOSIS — Z98.1 ARTHRODESIS STATUS: ICD-10-CM

## 2025-04-28 DIAGNOSIS — M48.061 SPINAL STENOSIS, LUMBAR REGION WITHOUT NEUROGENIC CLAUDICATION: ICD-10-CM

## 2025-04-28 PROCEDURE — 99204 OFFICE O/P NEW MOD 45 MIN: CPT

## 2025-04-28 RX ORDER — CYCLOBENZAPRINE 10 MG/1
10 TABLET ORAL 3 TIMES DAILY
Qty: 90 | Refills: 1 | Status: ACTIVE | COMMUNITY
Start: 2025-04-28 | End: 1900-01-01

## 2025-04-28 RX ORDER — HYDROCHLOROTHIAZIDE 12.5 MG/1
12.5 CAPSULE ORAL
Refills: 0 | Status: ACTIVE | COMMUNITY

## 2025-05-08 ENCOUNTER — APPOINTMENT (OUTPATIENT)
Dept: CARDIOLOGY | Facility: CLINIC | Age: 60
End: 2025-05-08
Payer: COMMERCIAL

## 2025-05-08 PROCEDURE — 93306 TTE W/DOPPLER COMPLETE: CPT

## 2025-05-16 ENCOUNTER — APPOINTMENT (OUTPATIENT)
Dept: NEUROSURGERY | Facility: CLINIC | Age: 60
End: 2025-05-16
Payer: COMMERCIAL

## 2025-05-16 VITALS
SYSTOLIC BLOOD PRESSURE: 128 MMHG | WEIGHT: 200 LBS | HEIGHT: 64 IN | BODY MASS INDEX: 34.15 KG/M2 | DIASTOLIC BLOOD PRESSURE: 79 MMHG | HEART RATE: 72 BPM | OXYGEN SATURATION: 93 %

## 2025-05-16 DIAGNOSIS — Z98.1 ARTHRODESIS STATUS: ICD-10-CM

## 2025-05-16 DIAGNOSIS — M54.16 RADICULOPATHY, LUMBAR REGION: ICD-10-CM

## 2025-05-16 DIAGNOSIS — M79.605 LOW BACK PAIN, UNSPECIFIED: ICD-10-CM

## 2025-05-16 DIAGNOSIS — M54.50 LOW BACK PAIN, UNSPECIFIED: ICD-10-CM

## 2025-05-16 DIAGNOSIS — M51.369: ICD-10-CM

## 2025-05-16 PROCEDURE — 99214 OFFICE O/P EST MOD 30 MIN: CPT

## 2025-06-04 ENCOUNTER — NON-APPOINTMENT (OUTPATIENT)
Age: 60
End: 2025-06-04

## 2025-06-30 ENCOUNTER — APPOINTMENT (OUTPATIENT)
Dept: ORTHOPEDIC SURGERY | Facility: CLINIC | Age: 60
End: 2025-06-30

## 2025-07-09 ENCOUNTER — APPOINTMENT (OUTPATIENT)
Dept: CARDIOLOGY | Facility: CLINIC | Age: 60
End: 2025-07-09
Payer: COMMERCIAL

## 2025-07-09 VITALS
HEIGHT: 64 IN | BODY MASS INDEX: 34.49 KG/M2 | OXYGEN SATURATION: 95 % | WEIGHT: 202 LBS | DIASTOLIC BLOOD PRESSURE: 60 MMHG | HEART RATE: 67 BPM | SYSTOLIC BLOOD PRESSURE: 124 MMHG

## 2025-07-09 PROCEDURE — G2211 COMPLEX E/M VISIT ADD ON: CPT | Mod: NC

## 2025-07-09 PROCEDURE — 99204 OFFICE O/P NEW MOD 45 MIN: CPT

## 2025-07-09 RX ORDER — ROSUVASTATIN CALCIUM 10 MG/1
10 TABLET, FILM COATED ORAL
Qty: 90 | Refills: 3 | Status: ACTIVE | COMMUNITY
Start: 2025-07-09 | End: 1900-01-01

## 2025-07-09 RX ORDER — GABAPENTIN 800 MG/1
800 TABLET, COATED ORAL 3 TIMES DAILY
Refills: 0 | Status: ACTIVE | COMMUNITY

## 2025-08-15 LAB — HBA1C MFR BLD HPLC: 5.5

## 2025-08-20 ENCOUNTER — APPOINTMENT (OUTPATIENT)
Dept: CARDIOLOGY | Facility: CLINIC | Age: 60
End: 2025-08-20
Payer: COMMERCIAL

## 2025-08-20 VITALS
HEART RATE: 77 BPM | OXYGEN SATURATION: 98 % | BODY MASS INDEX: 35.17 KG/M2 | DIASTOLIC BLOOD PRESSURE: 78 MMHG | WEIGHT: 206 LBS | HEIGHT: 64 IN | SYSTOLIC BLOOD PRESSURE: 120 MMHG

## 2025-08-20 DIAGNOSIS — K21.9 GASTRO-ESOPHAGEAL REFLUX DISEASE W/OUT ESOPHAGITIS: ICD-10-CM

## 2025-08-20 DIAGNOSIS — Q21.12 PATENT FORAMEN OVALE: ICD-10-CM

## 2025-08-20 DIAGNOSIS — I10 ESSENTIAL (PRIMARY) HYPERTENSION: ICD-10-CM

## 2025-08-20 DIAGNOSIS — E78.00 PURE HYPERCHOLESTEROLEMIA, UNSPECIFIED: ICD-10-CM

## 2025-08-20 PROCEDURE — G2211 COMPLEX E/M VISIT ADD ON: CPT | Mod: NC

## 2025-08-20 PROCEDURE — 93000 ELECTROCARDIOGRAM COMPLETE: CPT

## 2025-08-20 PROCEDURE — 99214 OFFICE O/P EST MOD 30 MIN: CPT
